# Patient Record
Sex: FEMALE | Race: WHITE | NOT HISPANIC OR LATINO | Employment: OTHER | URBAN - METROPOLITAN AREA
[De-identification: names, ages, dates, MRNs, and addresses within clinical notes are randomized per-mention and may not be internally consistent; named-entity substitution may affect disease eponyms.]

---

## 2017-02-23 ENCOUNTER — HISTORICAL (OUTPATIENT)
Dept: WOUND CARE | Facility: HOSPITAL | Age: 32
End: 2017-02-23

## 2017-05-08 LAB
BILIRUB SERPL-MCNC: NEGATIVE MG/DL
BLOOD URINE, POC: NEGATIVE
CLARITY, POC UA: NORMAL
COLOR, POC UA: NORMAL
GLUCOSE UR QL STRIP: NEGATIVE
KETONES UR QL STRIP: NEGATIVE
LEUKOCYTE EST, POC UA: NEGATIVE
NITRITE, POC UA: NEGATIVE
PH, POC UA: 7
PROTEIN, POC: NORMAL
SPECIFIC GRAVITY, POC UA: 1.01
UROBILINOGEN, POC UA: NORMAL

## 2017-05-23 ENCOUNTER — HOSPITAL ENCOUNTER (OUTPATIENT)
Dept: OBSTETRICS AND GYNECOLOGY | Facility: HOSPITAL | Age: 32
End: 2017-05-23
Attending: OBSTETRICS & GYNECOLOGY | Admitting: OBSTETRICS & GYNECOLOGY

## 2017-07-07 LAB
BILIRUB SERPL-MCNC: NEGATIVE MG/DL
BLOOD URINE, POC: NEGATIVE
CLARITY, POC UA: CLEAR
COLOR, POC UA: YELLOW
GLUCOSE UR QL STRIP: NEGATIVE
KETONES UR QL STRIP: NEGATIVE
LEUKOCYTE EST, POC UA: NEGATIVE
NITRITE, POC UA: NEGATIVE
PH, POC UA: 7
POC BETA-HCG (QUAL): NEGATIVE
PROTEIN, POC: NEGATIVE
SPECIFIC GRAVITY, POC UA: 1.01
UROBILINOGEN, POC UA: NORMAL

## 2017-12-22 LAB — POC BETA-HCG (QUAL): NEGATIVE

## 2018-02-15 ENCOUNTER — HISTORICAL (OUTPATIENT)
Dept: ADMINISTRATIVE | Facility: HOSPITAL | Age: 33
End: 2018-02-15

## 2018-02-15 LAB
APPEARANCE, UA: CLEAR
BACTERIA #/AREA URNS AUTO: ABNORMAL /[HPF]
BILIRUB UR QL STRIP: NEGATIVE
COLOR UR: YELLOW
GLUCOSE (UA): NORMAL
HAV IGM SERPL QL IA: NONREACTIVE
HBV CORE IGM SERPL QL IA: NONREACTIVE
HBV SURFACE AG SERPL QL IA: NEGATIVE
HCV AB SERPL QL IA: NONREACTIVE
HGB UR QL STRIP: NEGATIVE
HIV 1+2 AB+HIV1 P24 AG SERPL QL IA: NONREACTIVE
HYALINE CASTS #/AREA URNS LPF: ABNORMAL /[LPF]
KETONES UR QL STRIP: NEGATIVE
LEUKOCYTE ESTERASE UR QL STRIP: 75 LEU/UL
NITRITE UR QL STRIP: NEGATIVE
PH UR STRIP: 6.5 [PH] (ref 4.5–8)
PROT UR QL STRIP: 20 MG/DL
RBC #/AREA URNS AUTO: ABNORMAL /[HPF]
SP GR UR STRIP: 1.02 (ref 1–1.03)
SQUAMOUS #/AREA URNS LPF: ABNORMAL /[LPF]
T PALLIDUM AB SER QL: NONREACTIVE
UROBILINOGEN UR STRIP-ACNC: 2 MG/DL
WBC #/AREA URNS AUTO: ABNORMAL /HPF

## 2018-04-02 LAB — POC BETA-HCG (QUAL): NEGATIVE

## 2019-07-16 ENCOUNTER — HISTORICAL (OUTPATIENT)
Dept: ADMINISTRATIVE | Facility: HOSPITAL | Age: 34
End: 2019-07-16

## 2019-07-16 LAB
BILIRUB SERPL-MCNC: NEGATIVE MG/DL
BLOOD URINE, POC: NEGATIVE
CLARITY, POC UA: CLEAR
COLOR, POC UA: YELLOW
GLUCOSE UR QL STRIP: NEGATIVE
KETONES UR QL STRIP: NEGATIVE
LEUKOCYTE EST, POC UA: NEGATIVE
NITRITE, POC UA: NEGATIVE
PH, POC UA: 6
POC BETA-HCG (QUAL): POSITIVE
PROTEIN, POC: NORMAL
SPECIFIC GRAVITY, POC UA: 1.02
UROBILINOGEN, POC UA: NORMAL

## 2019-07-18 ENCOUNTER — HISTORICAL (OUTPATIENT)
Dept: FAMILY MEDICINE | Facility: CLINIC | Age: 34
End: 2019-07-18

## 2019-07-18 LAB
ABS NEUT (OLG): 9.24 X10(3)/MCL (ref 2.1–9.2)
AMPHET UR QL SCN: POSITIVE
BARBITURATE SCN PRESENT UR: NEGATIVE
BASOPHILS # BLD AUTO: 0.04 X10(3)/MCL
BASOPHILS NFR BLD AUTO: 0 %
BENZODIAZ UR QL SCN: NEGATIVE
BUN SERPL-MCNC: 11 MG/DL (ref 7–18)
CALCIUM SERPL-MCNC: 9 MG/DL (ref 8.5–10.1)
CANNABINOIDS UR QL SCN: NEGATIVE
CHLORIDE SERPL-SCNC: 104 MMOL/L (ref 98–107)
CO2 SERPL-SCNC: 25 MMOL/L (ref 21–32)
COCAINE UR QL SCN: NEGATIVE
CREAT SERPL-MCNC: 0.6 MG/DL (ref 0.6–1.3)
CREAT/UREA NIT SERPL: 18
EOSINOPHIL # BLD AUTO: 0.45 10*3/UL
EOSINOPHIL NFR BLD AUTO: 4 %
ERYTHROCYTE [DISTWIDTH] IN BLOOD BY AUTOMATED COUNT: 12.2 % (ref 11.5–14.5)
FINAL CULTURE: NORMAL
GLUCOSE SERPL-MCNC: 93 MG/DL (ref 74–106)
HBV SURFACE AG SERPL QL IA: NEGATIVE
HCT VFR BLD AUTO: 39 % (ref 35–46)
HCV AB SERPL QL IA: NONREACTIVE
HGB BLD-MCNC: 13.2 GM/DL (ref 12–16)
HIV 1+2 AB+HIV1 P24 AG SERPL QL IA: NONREACTIVE
IMM GRANULOCYTES # BLD AUTO: 0.06 10*3/UL
IMM GRANULOCYTES NFR BLD AUTO: 0 %
LYMPHOCYTES # BLD AUTO: 2.07 X10(3)/MCL
LYMPHOCYTES NFR BLD AUTO: 16 % (ref 13–40)
MCH RBC QN AUTO: 30.6 PG (ref 26–34)
MCHC RBC AUTO-ENTMCNC: 33.8 GM/DL (ref 31–37)
MCV RBC AUTO: 90.3 FL (ref 80–100)
MONOCYTES # BLD AUTO: 0.69 X10(3)/MCL
MONOCYTES NFR BLD AUTO: 6 % (ref 0–24)
NEUTROPHILS # BLD AUTO: 9.24 X10(3)/MCL
NEUTROPHILS NFR BLD AUTO: 74 X10(3)/MCL
OPIATES UR QL SCN: NEGATIVE
PCP UR QL: NEGATIVE
PH UR STRIP.AUTO: 6.5 [PH] (ref 5–8)
PLATELET # BLD AUTO: 235 X10(3)/MCL (ref 130–400)
PMV BLD AUTO: 11.5 FL (ref 7.4–10.4)
POTASSIUM SERPL-SCNC: 3.6 MMOL/L (ref 3.5–5.1)
RBC # BLD AUTO: 4.32 X10(6)/MCL (ref 4–5.2)
SODIUM SERPL-SCNC: 136 MMOL/L (ref 136–145)
T PALLIDUM AB SER QL: NONREACTIVE
TEMPERATURE, URINE (OHS): 21.8 DEGC (ref 20–25)
WBC # SPEC AUTO: 12.6 X10(3)/MCL (ref 4.5–11)

## 2020-01-16 ENCOUNTER — HISTORICAL (OUTPATIENT)
Dept: ADMINISTRATIVE | Facility: HOSPITAL | Age: 35
End: 2020-01-16

## 2020-01-16 LAB
AMPHET UR QL SCN: NEGATIVE
APPEARANCE, UA: ABNORMAL
BACTERIA #/AREA URNS AUTO: ABNORMAL /HPF
BARBITURATE SCN PRESENT UR: NEGATIVE
BENZODIAZ UR QL SCN: NEGATIVE
BILIRUB SERPL-MCNC: NORMAL MG/DL
BILIRUB UR QL STRIP: NEGATIVE
BLOOD URINE, POC: NORMAL
CANNABINOIDS UR QL SCN: NEGATIVE
CLARITY, POC UA: NORMAL
COCAINE UR QL SCN: NEGATIVE
COLOR UR: YELLOW
COLOR, POC UA: YELLOW
GLUCOSE (UA): NEGATIVE
GLUCOSE UR QL STRIP: NEGATIVE
HGB UR QL STRIP: NEGATIVE
HYALINE CASTS #/AREA URNS LPF: ABNORMAL /LPF
KETONES UR QL STRIP: NEGATIVE
KETONES UR QL STRIP: NEGATIVE
LEUKOCYTE EST, POC UA: NORMAL
LEUKOCYTE ESTERASE UR QL STRIP: 250 LEU/UL
NITRITE UR QL STRIP: NEGATIVE
NITRITE, POC UA: NEGATIVE
OPIATES UR QL SCN: NEGATIVE
PCP UR QL: NEGATIVE
PH UR STRIP.AUTO: 7 [PH] (ref 5–8)
PH UR STRIP: 7 [PH] (ref 4.5–8)
PH, POC UA: 7
PROT UR QL STRIP: 30 MG/DL
PROTEIN, POC: NORMAL
RBC #/AREA URNS AUTO: ABNORMAL /HPF
SP GR UR STRIP: 1.02 (ref 1–1.03)
SPECIFIC GRAVITY, POC UA: 1.02
SQUAMOUS #/AREA URNS LPF: >100 /LPF
TEMPERATURE, URINE (OHS): 25 DEGC (ref 20–25)
UROBILINOGEN UR STRIP-ACNC: 4 MG/DL
UROBILINOGEN, POC UA: NORMAL
WBC #/AREA URNS AUTO: ABNORMAL /HPF

## 2020-01-18 LAB — FINAL CULTURE: NO GROWTH

## 2020-01-23 ENCOUNTER — HISTORICAL (OUTPATIENT)
Dept: ADMINISTRATIVE | Facility: HOSPITAL | Age: 35
End: 2020-01-23

## 2020-01-23 LAB
ABS NEUT (OLG): 7.91 X10(3)/MCL (ref 2.1–9.2)
AMPHET UR QL SCN: NEGATIVE
BARBITURATE SCN PRESENT UR: NEGATIVE
BASOPHILS # BLD AUTO: 0 X10(3)/MCL (ref 0–0.2)
BASOPHILS NFR BLD AUTO: 0 %
BENZODIAZ UR QL SCN: NEGATIVE
BILIRUB SERPL-MCNC: NEGATIVE MG/DL
BLOOD URINE, POC: NEGATIVE
BUN SERPL-MCNC: 7 MG/DL (ref 7–18)
CALCIUM SERPL-MCNC: 8.9 MG/DL (ref 8.5–10.1)
CANNABINOIDS UR QL SCN: NEGATIVE
CHLORIDE SERPL-SCNC: 105 MMOL/L (ref 98–107)
CLARITY, POC UA: NORMAL
CO2 SERPL-SCNC: 26 MMOL/L (ref 21–32)
COCAINE UR QL SCN: NEGATIVE
COLOR, POC UA: YELLOW
CREAT SERPL-MCNC: 0.5 MG/DL (ref 0.6–1.3)
CREAT/UREA NIT SERPL: 14
EOSINOPHIL # BLD AUTO: 0.1 X10(3)/MCL (ref 0–0.9)
EOSINOPHIL NFR BLD AUTO: 1 %
ERYTHROCYTE [DISTWIDTH] IN BLOOD BY AUTOMATED COUNT: 14.6 % (ref 11.5–14.5)
GLUCOSE SERPL-MCNC: 66 MG/DL (ref 74–106)
GLUCOSE UR QL STRIP: NEGATIVE
HBV SURFACE AG SERPL QL IA: NEGATIVE
HCT VFR BLD AUTO: 34.7 % (ref 35–46)
HCV AB SERPL QL IA: NONREACTIVE
HGB BLD-MCNC: 11 GM/DL (ref 12–16)
HIV 1+2 AB+HIV1 P24 AG SERPL QL IA: NONREACTIVE
IMM GRANULOCYTES # BLD AUTO: 0.08 10*3/UL
IMM GRANULOCYTES NFR BLD AUTO: 1 %
KETONES UR QL STRIP: NEGATIVE
LEUKOCYTE EST, POC UA: NORMAL
LYMPHOCYTES # BLD AUTO: 2 X10(3)/MCL (ref 0.6–4.6)
LYMPHOCYTES NFR BLD AUTO: 19 %
MCH RBC QN AUTO: 28.4 PG (ref 26–34)
MCHC RBC AUTO-ENTMCNC: 31.7 GM/DL (ref 31–37)
MCV RBC AUTO: 89.4 FL (ref 80–100)
MONOCYTES # BLD AUTO: 0.4 X10(3)/MCL (ref 0.1–1.3)
MONOCYTES NFR BLD AUTO: 4 %
NEUTROPHILS # BLD AUTO: 7.91 X10(3)/MCL (ref 2.1–9.2)
NEUTROPHILS NFR BLD AUTO: 75 %
NITRITE, POC UA: NEGATIVE
OPIATES UR QL SCN: NEGATIVE
PCP UR QL: NEGATIVE
PH UR STRIP.AUTO: 7 [PH] (ref 5–8)
PH, POC UA: 7
PLATELET # BLD AUTO: 213 X10(3)/MCL (ref 130–400)
PMV BLD AUTO: 12 FL (ref 7.4–10.4)
POTASSIUM SERPL-SCNC: 3.6 MMOL/L (ref 3.5–5.1)
PROTEIN, POC: NEGATIVE
RBC # BLD AUTO: 3.88 X10(6)/MCL (ref 4–5.2)
SODIUM SERPL-SCNC: 137 MMOL/L (ref 136–145)
SPECIFIC GRAVITY, POC UA: 1.01
T PALLIDUM AB SER QL: NONREACTIVE
TEMPERATURE, URINE (OHS): 24 DEGC (ref 20–25)
UROBILINOGEN, POC UA: NORMAL
WBC # SPEC AUTO: 10.6 X10(3)/MCL (ref 4.5–11)

## 2020-01-25 LAB
FINAL CULTURE: NORMAL
FINAL CULTURE: NORMAL

## 2022-04-10 ENCOUNTER — HISTORICAL (OUTPATIENT)
Dept: ADMINISTRATIVE | Facility: HOSPITAL | Age: 37
End: 2022-04-10

## 2022-04-11 ENCOUNTER — HISTORICAL (OUTPATIENT)
Dept: ADMINISTRATIVE | Facility: HOSPITAL | Age: 37
End: 2022-04-11

## 2022-04-28 VITALS
HEIGHT: 65 IN | OXYGEN SATURATION: 99 % | DIASTOLIC BLOOD PRESSURE: 89 MMHG | WEIGHT: 137.13 LBS | BODY MASS INDEX: 22.85 KG/M2 | SYSTOLIC BLOOD PRESSURE: 127 MMHG

## 2022-04-28 VITALS
DIASTOLIC BLOOD PRESSURE: 89 MMHG | OXYGEN SATURATION: 99 % | WEIGHT: 137.13 LBS | BODY MASS INDEX: 22.85 KG/M2 | SYSTOLIC BLOOD PRESSURE: 127 MMHG | HEIGHT: 65 IN

## 2022-04-30 NOTE — PROGRESS NOTES
Patient:   Yuni Lentz            MRN: [id number]            FIN: [id number]               Age:   34 years     Sex:  Female     :  1985   Associated Diagnoses:   IUP (intrauterine pregnancy), incidental   Author:   Jayshree Martin MD      Chief Complaint   Pregnant   2020 11:01 CST      37wks 2days        History of Present Illness     History of Present Illness:  33yo  at 37^2 WGA, EGD per 22^3 ultrasound only (EDC 20) presents for her initial OB visit.         Today:   Some heartburn with occasional vomiting. Patient denies urinary urgency/urinary frequency/or discomfort. Patient wants to have tubal ligation..      Chronic Issues:  Bipolar d/o      OB Review of Systems:  Vaginal bleeding: denies  Vaginal discharge: denies  Loss of fluid: denies  Contractions: denies  Headaches: denies  Vision changes: denies  Edema: denies      Gestational History:  G1: Full term,   G2: Full term,   G3:Full term,   G4:Full term,   G5: Full term,  for non-reassuring FHTs  G6: Full term, Repeat   G7: current      Gyn History:   -LMP: 2019  -Age at menarche: 13yo  -Menstrual hx: periods regular interval lasting 3-5 days, every 28 days  -History of STDs and/or Abnormal PAPs: Denies h/o abnormal pap smears, last Pap smear 2019(NIL), has herpes, trichomoniasis  1x but treated    Past Medical History: Bipolar  Surgical History:  ×2  Family History: mom passed from ovarian ca  Social History: smokes 5cigs/day, denies EtOH, drugs  Medications: Prenatal multivitamin  Allergies: Bactrim             Review of Systems   no f, cp, sob      Health Status   Allergies:    Allergic Reactions (Selected)  Severity Not Documented  Bactrim- Hives.,    Allergies (1) Active Reaction  Bactrim hives     Current medications:  (Selected)   Outpatient Medications  Ordered  tetanus/diphtheria/pertussis, acel (Tdap) 5 units-2 units-15.5 mcg/0.5 mL intramuscular suspension...: 0.5 mL, form:  Injection, IM, Once, first dose 08/15/12 20:00:00 CDT, stop date 08/15/12 20:00:00 CDT, Give if patient had last tetanus immunization greater than 2 years prior.  Prescriptions  Prescribed  Prenatal Multivitamin/Folic Acid 0.4 mg oral tablet ([facility] Substitution): 1 tab(s), Oral, Daily, # 100 tab(s), 5 Refill(s), Pharmacy: [facility],    Home Medications (1) Active  Prenatal Multivitamin/Folic Acid 0.4 mg oral tablet ([facility] Substitution) 1 tab(s), Oral, Daily     Problem list:    All Problems  Encounter for fetal anatomic survey / SNOMED CT 231612354 / Confirmed  Low lying placenta without hemorrhage, antepartum / SNOMED CT 1584913242 / Confirmed  Acute viral bronchitis / SNOMED CT 830171816 / Confirmed  Tobacco abuse / SNOMED CT 173975967 / Confirmed  Ineffective coping (individual) / SNOMED CT 29971520 / Confirmed  Infectious disease in mother complicating pregnancy, childbirth AND/OR puerperium / SNOMED CT 69960178 / Confirmed  Problem added by Discern Expert  Group B streptococcus / SNOMED CT 846104941 / Confirmed  Problem added by Discern Expert  Abscess of chin / SNOMED CT 23789773 / Confirmed  Active labor / ICD-9- / Confirmed   (spontaneous vaginal delivery) / ICD-9- / Confirmed  Pregnant / SNOMED CT 433859809 / Confirmed,    Active Problems (11)  Abscess of chin   Active labor   Acute viral bronchitis   Encounter for fetal anatomic survey   Group B streptococcus   Ineffective coping (individual)   Infectious disease in mother complicating pregnancy, childbirth AND/OR puerperium   Low lying placenta without hemorrhage, antepartum   Pregnant    (spontaneous vaginal delivery)   Tobacco abuse         Physical Examination   Vital Signs   2020 11:01 CST      Temperature Oral          36.7 DegC                             Temperature Oral (calculated)             98.06 DegF                             Peripheral Pulse Rate     86 bpm                             Respiratory Rate           20 br/min                             SpO2                      99 %                             Systolic Blood Pressure   127 mmHg                             Diastolic Blood Pressure  89 mmHg                             Mean Arterial Pressure, Cuff              102 mmHg                             Blood Pressure Location   Left arm                             Blood Pressure Cuff Size  Adult small     Measurements from flowsheet : Measurements   1/23/2020 11:01 CST      Weight Measured           62.2 kg                             Weighing Method           Standing                             Height/Length Measured    165 cm                             BSA Measured              1.69 m2                             Body Mass Index Measured  22.85 kg/m2                             Ideal Body Weight Calculated              56.023097 kg     General: no apparent distress  HEENT: EOMI  Respiratory: normal work of breathing  Cardiovascular: normal perfusion  Gastrointestinal: Gravid, nontender to palpation, no guarding, no rigidity  Obstetric Exam: No abnormalities were noted during examination of the vulva, , Fundal height 36.5cm  Genitourinary: No suprapubic tenderness  Neurologic: Alert and oriented  Psychiatric: cooperative         Health Maintenance      Health Maintenance     Pending (in the next year)     There are no current recommendations pending        Due In Future            ADL Screening not due until  06/14/20  and every 1  year(s)           Alcohol Misuse Screening not due until  06/14/20  and every 1  year(s)           Smoking Cessation not due until  06/14/20  and every 1  year(s)           Obesity Screening not due until  01/01/21  and every 1  year(s)           Blood Pressure Screening not due until  01/22/21  and every 1  year(s)     Satisfied (in the past 1 year)        Satisfied            ADL Screening on  06/14/19.  Satisfied by Yuliana WARE, Elaine Chacko           Blood Pressure Screening  on  01/23/20.  Satisfied by Hailey Richardson           Body Mass Index Check on  01/23/20.  Satisfied by Hailey Richardson           Cervical Cancer Screening on  07/16/19.  Satisfied by Tegan Ferrell           Depression Screening on  01/16/20.  Satisfied by Kylee Fermin RN           Hypertension Management-Blood Pressure on  01/23/20.  Satisfied by Hailey Richardson.           Influenza Vaccine on  01/16/20.  Satisfied by Kylee Fermin RN           Obesity Screening on  01/23/20.  Satisfied by Hailey Richardson          Review / Management   Results review:  Lab results   1/23/2020 11:11 CST      Urine Color Urine Dipstick                Yellow                             Urine Appearance Urine Dipstick           Slightly cloudy                             pH Urine Dipstick         7                             Specific Gravity Urine Dipstick           1.015                             Blood Urine Dipstick      Negative                             Glucose Urine Dipstick    Negative                             Ketones Urine Dipstick    Negative                             Protein Urine Dipstick    Negative                             Bilirubin Urine Dipstick  Negative                             Urobilinogen Urine Dipstick               1 mg/dl                             Leukocytes Urine Dipstick Small                             Nitrite Urine Dipstick    Negative  .    Initial OB Labs: Initial OB labs collected on 1/23/20, except for pap   - Blood Type and Rh:    - Antibody Screen:    - CBC H/H:    - HIV:    - RPR:    - GC:    - CT:    - HBsAg:    - HCVAb:    - Rubella:    - Varicella:    - UA & Culture:    - Sickle Cell Screen:    - PAP:    - Influenza vaccine date:    15-20 Weeks Lab    - Quad Screen: out of range    28 Week Lab    - 1H GTT: out of range    - Rhogam: NA    - Date of Tdap: _    - CBC H/H: _    - RPR: _    36 Week Lab    - CBC H/H: _    - RPR: _    - GBS Culture: obtained 1/23/20     - HIV: _    - GC: _      Radiology results     Ultrasound(s):    Comments: 2nd trimester intrauterine pregnancy measuring 22^3 for EDC of 20. FHR: 145. Viable pregnancy. ASSIGN EDC: 2020           Impression and Plan     1. Late for prenatal care @ 36^2  - PNVs  - Routine (initial and 36wk) labs: pending, patient left before bloodwork obtained last week, but obtained today, UDS per DCFS request today  - Mother plans on breast and bottlefeeds  - Tubal ligation consent signed and placed in chart  - Strict Labor Precautions: Discussed meds, diet, and complications associated with pregnancy in detail. fever, vaginal bleeding or leaking fluid, belly cramping or pain, shortness of breathchest pain, swelling of the facehands, ankles and feet or legs.  If don't feel the baby move in over an hour. Change in vision. Severe headache that are noted resolved with medication. Strict diet and over-the-counter precautions were provided.   - Scheduled rpt  for 2020 9am - will be 39^1 WGA    2. Acid reflux  -Prevacid sent to pharmacy    Disposition:  RTC in 1wk in [facility]      Diagnosis     IUP (intrauterine pregnancy), incidental ([id number] Z34.90).       Jayshree Martin MD (HO1)  [facility]

## 2022-04-30 NOTE — PROGRESS NOTES
Patient:   Yuni Lentz            MRN: [id number]            FIN: [id number]               Age:   34 years     Sex:  Female     :  1985   Associated Diagnoses:   IUP (intrauterine pregnancy), incidental   Author:   Jayshree Martin MD      Chief Complaint   Pregnant   2020 9:30 CST       Initial OB 36w2d - no c/o, wants schedule  for 20, asked about getting tubes tied or IUD      History of Present Illness     History of Present Illness:  33yo  at 36^2 WGA, EGD per 22^3 ultrasound only (EDC 20) presents for her initial OB visit.         Today:   No major complaints today. Patient denies urinary urgency/urinary frequency/or discomfort. Patient wants to have tubal ligation..      Chronic Issues:  Bipolar d/o      OB Review of Systems:  Vaginal bleeding: denies  Vaginal discharge: denies  Loss of fluid: denies  Contractions: denies  Headaches: denies  Vision changes: denies  Edema: denies      Gestational History:  G1: Full term,   G2: Full term,   G3: Full term,   G4: Full term,   G5: Full term,  for non-reassuring FHTs  G6: Full term, Repeat   G7: current      Gyn History:   -LMP: 2019  -Age at menarche: 15yo  -Menstrual hx: periods regular interval lasting 3-5 days, every 28 days  -History of STDs and/or Abnormal PAPs: Denies h/o abnormal pap smears, last Pap smear 2019(NIL), has herpes, trichomoniasis  1x but treater    Past Medical History: Bipolar  Surgical History:  ×2  Family History: mom passed from ovarian ca  Social History: smokes 5cigs/day, denies EtOH, drugs  Medications: Prenatal multivitamin  Allergies: Bactrim             Review of Systems   no f, cp, sob      Health Status   Allergies:    Allergic Reactions (Selected)  Severity Not Documented  Bactrim- Hives.,    Allergies (1) Active Reaction  Bactrim hives     Current medications:  (Selected)   Outpatient Medications  Ordered  tetanus/diphtheria/pertussis, acel  (Tdap) 5 units-2 units-15.5 mcg/0.5 mL intramuscular suspension...: 0.5 mL, form: Injection, IM, Once, first dose 08/15/12 20:00:00 CDT, stop date 08/15/12 20:00:00 CDT, Give if patient had last tetanus immunization greater than 2 years prior.  Prescriptions  Prescribed  Prenatal Multivitamin/Folic Acid 0.4 mg oral tablet ([facility] Substitution): 1 tab(s), Oral, Daily, # 100 tab(s), 5 Refill(s), Pharmacy: [facility],    Home Medications (1) Active  Prenatal Multivitamin/Folic Acid 0.4 mg oral tablet ([facility] Substitution) 1 tab(s), Oral, Daily     Problem list:    All Problems  Encounter for fetal anatomic survey / SNOMED CT 838007252 / Confirmed  Low lying placenta without hemorrhage, antepartum / SNOMED CT 8974304841 / Confirmed  Acute viral bronchitis / SNOMED CT 462232003 / Confirmed  Tobacco abuse / SNOMED CT 404771127 / Confirmed  Ineffective coping (individual) / SNOMED CT 04342066 / Confirmed  Infectious disease in mother complicating pregnancy, childbirth AND/OR puerperium / SNOMED CT 93020244 / Confirmed  Problem added by Discern Expert  Group B streptococcus / SNOMED CT 102491760 / Confirmed  Problem added by Discern Expert  Abscess of chin / SNOMED CT 16618781 / Confirmed  Active labor / ICD-9- / Confirmed   (spontaneous vaginal delivery) / ICD-9- / Confirmed  Pregnant / SNOMED CT 642484373 / Confirmed,    Active Problems (11)  Abscess of chin   Active labor   Acute viral bronchitis   Encounter for fetal anatomic survey   Group B streptococcus   Ineffective coping (individual)   Infectious disease in mother complicating pregnancy, childbirth AND/OR puerperium   Low lying placenta without hemorrhage, antepartum   Pregnant    (spontaneous vaginal delivery)   Tobacco abuse         Physical Examination   Vital Signs   2020 9:30 CST       Temperature Oral          37.1 DegC                             Temperature Oral (calculated)             98.78 DegF                              Peripheral Pulse Rate     106 bpm  HI                             Respiratory Rate          18 br/min                             SpO2                      98 %                             Saturation Probe Site     Hand, right                             Systolic Blood Pressure   121 mmHg                             Diastolic Blood Pressure  77 mmHg                             Blood Pressure Location   Left arm                             Blood Pressure Cuff Size  Adult     Measurements from flowsheet : Measurements   1/16/2020 9:30 CST       Weight Measured           59.8 kg                             Height/Length Measured    165 cm     General: no apparent distress  HEENT: EOMI  Respiratory: normal work of breathing  Cardiovascular: normal perfusion  Gastrointestinal: Gravid, nontender to palpation, no guarding, no rigidity  Obstetric Exam: No abnormalities were noted during examination of the vulva, vagina, cervix, , Fundal height 34cm  Genitourinary: No suprapubic tenderness  Neurologic: Alert and oriented  Psychiatric: cooperative         Health Maintenance      Health Maintenance     Pending (in the next year)     There are no current recommendations pending        Due In Future            ADL Screening not due until  06/14/20  and every 1  year(s)           Alcohol Misuse Screening not due until  06/14/20  and every 1  year(s)           Smoking Cessation not due until  06/14/20  and every 1  year(s)           Obesity Screening not due until  01/01/21  and every 1  year(s)           Blood Pressure Screening not due until  01/15/21  and every 1  year(s)     Satisfied (in the past 1 year)        Satisfied            ADL Screening on  06/14/19.  Satisfied by Elaine Bridges RN           Blood Pressure Screening on  01/16/20.  Satisfied by Kylee Fermin RN           Body Mass Index Check on  10/18/19.  Satisfied by Mely Cruz RN           Cervical Cancer Screening on  07/16/19.  Satisfied by  Tegan Ferrell           Depression Screening on  01/16/20.  Satisfied by Kylee Fermin RN           Hypertension Management-Blood Pressure on  01/16/20.  Satisfied by Kylee Fermin RN           Influenza Vaccine on  01/16/20.  Satisfied by Kylee Fermin RN           Obesity Screening on  01/16/20.  Satisfied by Kylee Fermin RN          Review / Management   Initial OB Labs: Initial OB labs collected on 1/16/20, except for pap   - Blood Type and Rh:    - Antibody Screen:    - CBC H/H:    - HIV:    - RPR:    - GC:    - CT:    - HBsAg:    - HCVAb:    - Rubella:    - Varicella:    - UA & Culture:    - Sickle Cell Screen:    - PAP:    - Influenza vaccine date:    15-20 Weeks Lab    - Quad Screen: out of range    28 Week Lab    - 1H GTT: out of range    - Rhogam: NA    - Date of Tdap: _    - CBC H/H: _    - RPR: _    36 Week Lab    - CBC H/H: _    - RPR: _    - GBS Culture: _    - HIV: _    - GC: _      Radiology results     Ultrasound(s):    Comments: 2nd trimester intrauterine pregnancy measuring 22^3 for EDC of 2/11/20. FHR: 145. Viable pregnancy. ASSIGN EDC: 2/11/2020       Results review:  Lab results   1/16/2020 9:40 CST       Urine Color Urine Dipstick                Yellow                             Urine Appearance Urine Dipstick           Cloudy                             pH Urine Dipstick         7                             Specific Gravity Urine Dipstick           1.020                             Blood Urine Dipstick      Trace                             Glucose Urine Dipstick    Negative                             Ketones Urine Dipstick    Negative                             Protein Urine Dipstick    Trace                             Bilirubin Urine Dipstick  Small                             Urobilinogen Urine Dipstick               2 mg/dl                             Leukocytes Urine Dipstick Small                             Nitrite Urine Dipstick    Negative  .       Impression and  Plan     1. Intrauterine Pregnancy:  - OB Protocol   - PNVs  - Routine (initial and 36wk) labs: pending, also UDS since pt tested + for Amphetamines in 7/2019 and 10/2019  - Mother plans on breast and bottlefeeds  - Tubal ligation consent signed and placed in chart  - Strict Labor Precautions: Discussed meds, diet, and complications associated with pregnancy in detail. fever, vaginal bleeding or leaking fluid, belly cramping or pain, shortness of breathchest pain, swelling of the facehands, ankles and feet or legs.  If don't feel the baby move in over an hour. Change in vision. Severe headache that are noted resolved with medication.  Strict diet and over-the-counter precautions were provided.       Disposition:  Assigned to [facility]  RTC in 1wk       Diagnosis     IUP (intrauterine pregnancy), incidental ([id number] Z34.90).       Jayshree Martin MD (HO1)  \[facility] [place] [facility]\""-[facility]

## 2022-04-30 NOTE — PROGRESS NOTES
Patient:   Yuni Carver            MRN: [id number]            FIN: [id number]               Age:   33 years     Sex:  Female     :  1985   Associated Diagnoses:   None   Author:   Magan Torres MD      Chief Complaint   2019 14:56 CDT      WIC-UPT, c/o burning with urination, reports vaginal discharge with itching  x 3 days        History of Present Illness     Age: 33    G's & P's: [id number]    Gestation age: 9&6/7 WGA by LMP only    HEENA: 2020 by LMP only    Consistent with: (awaiting US scheduling)    New Issues: [facility] slept with new sexual partner in July, current partner and new partner named Marissa (states a friend).  Patient found out she was pregnant on 2019. [facility] sexual encounter was after finding out she was pregnant    Chronic Issues: Bipolar, has not returned to [facility] since instructed to do so prior visit 2019      Histories   Gestational History:  G1: Full term,   G2: Full term,   G3: Full term,   G4: Full term,   G5: Full term,  for non-reassuring FHTs  G6: Full term, Repeat   G7: current    Gyn History:    - LMP: 2019   - Age at menarche: 14 years   - Menstrual hx: regular, 28 day cycles, normal pads/day   - History of birth control: Never    - History of STDs and/or Abnormal PAPs: Trichomoniasis (treater in herself, partner not treater), no abnormal PAPs in the past    PMHx: HTN, depression, bipolar disorder  PSHx:  x2  Social Hx: Denies alcohol and illicits, smoking status needs to be addressed  Family Hx: Non-contributory  Medications: PNVs      Review of Systems     Antepartum specific     - Fetal movements: Yes   - Vaginal bleeding: No   - Vaginal discharge: Green discharge   - Loss of fluid: Denies   - Contractions: Denies   - Headaches: Denies   - Vision changes: Denies   - Edema: Feet, better with elevation  - Nausea/vomiting: Has been constant      Constitutional:  No fever, No chills, No fatigue.     Ear/Nose/Mouth/Throat:  Negative except as documented in history of present illness.    Respiratory:  Shortness of breath, No cough, No wheezing.    Cardiovascular:  No chest pain, No palpitations, No peripheral edema.    Gastrointestinal:  No nausea, No vomiting.    Genitourinary:  Negative except as documented in history of present illness.    Gynecologic:  Negative except as documented in history of present illness.    Musculoskeletal:  No back pain, No joint pain, No muscle pain.    Integumentary:  Negative except as documented in history of present illness.    Psychiatric:  [place], Not homicidal, Not suicidal.       Physical Examination   Vital Signs   7/16/2019 14:56 CDT      Temperature Oral          36.9 DegC                             Peripheral Pulse Rate     107 bpm  HI                             Respiratory Rate          20 br/min                             SpO2                      99 %                             Systolic Blood Pressure   98 mmHg                             Diastolic Blood Pressure  64 mmHg     Measurements from flowsheet : Measurements   7/16/2019 14:56 CDT      Weight Measured           53.30 kg                             Height/Length Measured    165 cm                             Body Mass Index Measured  19.58 kg/m2     General:  Alert and oriented, No acute distress.    Eye:  Extraocular movements are intact.    HENT:  Normocephalic.    Neck:  Supple.    Respiratory:  Lungs are clear to auscultation, Respirations are non-labored, Breath sounds are equal.    Cardiovascular:  Normal rate, Regular rhythm, Good pulses equal in all extremities, No edema.    Gastrointestinal:  Soft, Non-tender, Normal bowel sounds, gravid.    Obstetric Exam     Uterus: consistent with gestational age.     Perineum: intact, no lesions.     Vulva: no lesions.     Vagina: discharge (moderate amount, foul smelling, yellow), Embedded tampon present, no lesions, no bleeding.     Cervix: not dilated, no  lesions.     No contractions noted.     Integumentary:  Warm, Dry.    Neurologic:  Alert, Oriented, No focal deficits.    Cognition and Speech:  Speech clear and coherent.    Psychiatric:  Appropriate mood & affect.       Review / Management   Results review:  Lab results   2019 13:54 CDT      Urine Color Urine Dipstick                Yellow                             Urine Appearance Urine Dipstick           Clear                             pH Urine Dipstick         6                             Specific Gravity Urine Dipstick           1.025                             Blood Urine Dipstick      Negative                             Glucose Urine Dipstick    Negative                             Ketones Urine Dipstick    Negative                             Protein Urine Dipstick    Trace                             Bilirubin Urine Dipstick  Negative                             Urobilinogen Urine Dipstick               0.2 mg/dl                             Leukocytes Urine Dipstick Negative                             Nitrite Urine Dipstick    Negative  .      Initial OB Labs:   - Blood Type and Rh: _   - Antibody Screen: _   - CBC H/H: _   - HIV: _   - Syph Ab: _   - GC: _   - CT: _   - HBsAg: _   - HCVAb: _   - Rubella: _   - Varicella: _   - UA & Culture: _   - Sickle Cell Screen: _   - PAP: _   - Influenza vaccine date: _    15-20 Weeks Lab    - Quad Screen: _    28 Week Lab    - 1H GTT: _    - Rhogam: _    - Date of Tdap: _    - CBC H/H: _    - Syph Ab: _    36 Week Lab    - CBC H/H: _    - Syph Ab: _    - GBS Culture: _    - HIV: _    - Urine: GC: _    Ultrasound(s):  - Initial U/S:  - 20wk Anatomy scan:      Impression and Plan   Diagnosis   Currently pregnant 33 year old   female at 9wks 6days      1. Intrauterine Pregnancy    - OB Protocol     - PNVs prescribed this visit    - Urine dip: reviewed, as above    - Routine (initial) labs: ordered this visit, patient left before obtaining    - Mother  plans on breastfeeding    - Postpartum contraception discussion: Declines    - Labor precautions discussed in depth    2. High risk Psychiatic untreated Bipolar pregnancy   - UDS ordered this visit   - Consider case management consult at time of delivery    3. Vaginal discharge   - Vaginal foreign body removed on physical exam this visit, patient reports as tampon placed 3 months ago, no evidence of systemic illness   - Culture ordered and object submitted to lab for identification   - GC/CT and wet prep ordered     Schedule for OB initial US, schedule with initial OB clinic

## 2022-07-06 ENCOUNTER — HOSPITAL ENCOUNTER (EMERGENCY)
Facility: HOSPITAL | Age: 37
Discharge: HOME | End: 2022-07-06
Attending: FAMILY MEDICINE
Payer: MEDICARE

## 2022-07-06 VITALS
RESPIRATION RATE: 16 BRPM | HEIGHT: 65 IN | DIASTOLIC BLOOD PRESSURE: 75 MMHG | BODY MASS INDEX: 19.69 KG/M2 | HEART RATE: 98 BPM | SYSTOLIC BLOOD PRESSURE: 110 MMHG | TEMPERATURE: 98 F | OXYGEN SATURATION: 100 % | WEIGHT: 118.19 LBS

## 2022-07-06 DIAGNOSIS — A60.00 GENITAL HERPES SIMPLEX, UNSPECIFIED SITE: Primary | ICD-10-CM

## 2022-07-06 DIAGNOSIS — R82.90 ABNORMAL FINDING ON URINALYSIS: ICD-10-CM
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
## 2022-07-06 LAB
APPEARANCE UR: CLEAR
B-HCG UR QL: NEGATIVE
BACTERIA #/AREA URNS AUTO: ABNORMAL /HPF
BILIRUB UR QL STRIP.AUTO: NEGATIVE MG/DL
COLOR UR AUTO: ABNORMAL
CTP QC/QA: YES
GLUCOSE UR QL STRIP.AUTO: NORMAL MG/DL
HYALINE CASTS #/AREA URNS LPF: ABNORMAL /LPF
KETONES UR QL STRIP.AUTO: NEGATIVE MG/DL
LEUKOCYTE ESTERASE UR QL STRIP.AUTO: 250 UNIT/L
MUCOUS THREADS URNS QL MICRO: ABNORMAL /LPF
NITRITE UR QL STRIP.AUTO: NEGATIVE
PH UR STRIP.AUTO: 7.5 [PH]
PROT UR QL STRIP.AUTO: NEGATIVE MG/DL
RBC #/AREA URNS AUTO: ABNORMAL /HPF
RBC UR QL AUTO: NEGATIVE UNIT/L
SP GR UR STRIP.AUTO: 1.01
SQUAMOUS #/AREA URNS LPF: ABNORMAL /HPF
UROBILINOGEN UR STRIP-ACNC: NORMAL MG/DL
WBC #/AREA URNS AUTO: ABNORMAL /HPF

## 2022-07-06 PROCEDURE — 63600175 PHARM REV CODE 636 W HCPCS: Performed by: PHYSICIAN ASSISTANT

## 2022-07-06 PROCEDURE — 90715 TDAP VACCINE 7 YRS/> IM: CPT | Performed by: PHYSICIAN ASSISTANT

## 2022-07-06 PROCEDURE — 90471 IMMUNIZATION ADMIN: CPT | Performed by: PHYSICIAN ASSISTANT

## 2022-07-06 PROCEDURE — 99284 EMERGENCY DEPT VISIT MOD MDM: CPT | Mod: 25

## 2022-07-06 PROCEDURE — 81025 URINE PREGNANCY TEST: CPT | Performed by: PHYSICIAN ASSISTANT

## 2022-07-06 PROCEDURE — 81001 URINALYSIS AUTO W/SCOPE: CPT | Performed by: PHYSICIAN ASSISTANT

## 2022-07-06 RX ORDER — AMOXICILLIN AND CLAVULANATE POTASSIUM 875; 125 MG/1; MG/1
1 TABLET, FILM COATED ORAL 2 TIMES DAILY
Qty: 14 TABLET | Refills: 0 | OUTPATIENT
Start: 2022-07-06 | End: 2022-08-12

## 2022-07-06 RX ORDER — KETOROLAC TROMETHAMINE 10 MG/1
10 TABLET, FILM COATED ORAL EVERY 8 HOURS
Qty: 15 TABLET | Refills: 0 | Status: SHIPPED | OUTPATIENT
Start: 2022-07-06 | End: 2022-07-11

## 2022-07-06 RX ORDER — ACYCLOVIR 800 MG/1
800 TABLET ORAL 3 TIMES DAILY
Qty: 6 TABLET | Refills: 0 | OUTPATIENT
Start: 2022-07-06 | End: 2022-08-12

## 2022-07-06 RX ADMIN — TETANUS TOXOID, REDUCED DIPHTHERIA TOXOID AND ACELLULAR PERTUSSIS VACCINE, ADSORBED 0.5 ML: 5; 2.5; 8; 8; 2.5 SUSPENSION INTRAMUSCULAR at 11:07

## 2022-07-06 NOTE — ED PROVIDER NOTES
"Encounter Date: 7/6/2022       History     Chief Complaint   Patient presents with    Groin Pain     Reports genital herpes outbreak, constant x1 month; has not had an outbreak since a child. Also c/o dog bite to right medial ankle x4 days.     Yuni Lentz is a 36 y.o. female who presents to the ED with complaints of a herpes outbreak that is on her vagina to her anus that started 1 month(s) ago. She reports she was diagnosed with genital herpes "as a child" and has not had an outbreak since then. She reports the lesions are very painful. Also complaining of a dog bite to the R foot that occurred 4 days ago. She states she was cutting her grass and her neighbors dog bit her. States the owner of the dog reports the dog is up to date on vaccine. She states she is not up to date on tetanus vaccine.        The history is provided by the patient. No  was used.     Review of patient's allergies indicates:   Allergen Reactions    Sulfamethoxazole-trimethoprim Anaphylaxis     Past Medical History:   Diagnosis Date    Asthma     Genital herpes      History reviewed. No pertinent surgical history.  History reviewed. No pertinent family history.  Social History     Tobacco Use    Smoking status: Current Every Day Smoker     Packs/day: 0.50    Smokeless tobacco: Never Used   Substance Use Topics    Drug use: Yes     Frequency: 3.0 times per week     Types: Methamphetamines     Review of Systems   Constitutional: Negative for chills, fatigue and fever.   HENT: Negative for congestion, ear pain, sinus pain and sore throat.    Eyes: Negative for pain.   Respiratory: Negative for cough, chest tightness and shortness of breath.    Cardiovascular: Negative for chest pain.   Gastrointestinal: Negative for abdominal pain, constipation, diarrhea, nausea and vomiting.   Genitourinary: Positive for genital sores and vaginal pain. Negative for dysuria, flank pain, pelvic pain, urgency, vaginal bleeding and " "vaginal discharge.   Musculoskeletal: Negative for back pain and joint swelling.   Skin: Positive for wound. Negative for color change and rash.   Neurological: Negative for dizziness and weakness.   Psychiatric/Behavioral: Negative for behavioral problems and confusion.       Physical Exam     Initial Vitals [07/06/22 1043]   BP Pulse Resp Temp SpO2   110/75 98 16 98.3 °F (36.8 °C) 100 %      MAP       --         Physical Exam    Constitutional: She appears well-developed and well-nourished.   HENT:   Head: Normocephalic and atraumatic.   Nose: Nose normal.   Eyes: EOM are normal. Pupils are equal, round, and reactive to light.   Neck: Neck supple. No thyromegaly present. No JVD present.   Normal range of motion.  Cardiovascular: Normal rate, regular rhythm, normal heart sounds and intact distal pulses.   No murmur heard.  Pulmonary/Chest: Breath sounds normal. No respiratory distress. She has no wheezes. She has no rhonchi. She has no rales. She exhibits no tenderness.   Abdominal: Abdomen is soft. Bowel sounds are normal. She exhibits no distension. There is no abdominal tenderness. There is no rebound and no guarding.   Genitourinary:    Genitourinary Comments: Patient deferred exam today, reports painful lesions and "I know its herpes"     Musculoskeletal:         General: No tenderness or edema. Normal range of motion.      Cervical back: Normal range of motion and neck supple.     Lymphadenopathy:     She has no cervical adenopathy.   Neurological: She is alert and oriented to person, place, and time.   Skin: Skin is warm and dry. Capillary refill takes less than 2 seconds.   Right medial foot dog bite with associated erythema, no drainage or warmth   Psychiatric: She has a normal mood and affect. Thought content normal.         ED Course   Procedures  Labs Reviewed   URINALYSIS, REFLEX TO URINE CULTURE - Abnormal; Notable for the following components:       Result Value    Color, UA Light-Yellow (*)     " Leukocyte Esterase,   (*)     WBC, UA 6-10 (*)     Bacteria, UA Trace (*)     Squamous Epithelial Cells, UA Occ (*)     Mucous, UA Trace (*)     All other components within normal limits   POCT URINE PREGNANCY          Imaging Results    None          Medications   Tdap (BOOSTRIX) vaccine injection 0.5 mL (0.5 mLs Intramuscular Given 7/6/22 1105)                 ED Course as of 07/06/22 1151   Wed Jul 06, 2022   1143 Reassessed patient at this time. She is sitting comfortably in the exam chair. Her urine is most likely consistent with dirty catch, but since patient was bitten by a dog, I will cover her with Augmentin which will cover acute cysitis. She verbalized understanding to this. Encouraged her to go to the health unit for STD testing if she desires. She verbalized undertanding. Animal control was contacted while in the ED. Strict return precautions given. Stable for discharge.  [VJ]      ED Course User Index  [VJ] Hailey Patten PA-C             Clinical Impression:   Final diagnoses:  [A60.00] Genital herpes simplex, unspecified site (Primary)  [W54.0XXA] Dog bite, initial encounter  [R82.90] Abnormal finding on urinalysis          ED Disposition Condition    Discharge Stable        ED Prescriptions     Medication Sig Dispense Start Date End Date Auth. Provider    acyclovir (ZOVIRAX) 800 MG Tab Take 1 tablet (800 mg total) by mouth 3 (three) times daily. for 2 days 6 tablet 7/6/2022 7/8/2022 Hailey Patten PA-C    amoxicillin-clavulanate 875-125mg (AUGMENTIN) 875-125 mg per tablet Take 1 tablet by mouth 2 (two) times daily. 14 tablet 7/6/2022  Hailey Patten PA-C    ketorolac (TORADOL) 10 mg tablet Take 1 tablet (10 mg total) by mouth every 8 (eight) hours. for 5 days 15 tablet 7/6/2022 7/11/2022 Hailey Patten PA-C        Follow-up Information     Follow up With Specialties Details Why Contact Info    [facility] Emergency Medicine In 3 days As needed,  If symptoms worsen [address] [facility] [id number] [phone]    [facility]  In 1 week  [address] [phone]           Hailey Patten PA-C  07/06/22 1152

## 2022-07-30 ENCOUNTER — HOSPITAL ENCOUNTER (EMERGENCY)
Facility: HOSPITAL | Age: 37
Discharge: HOME | End: 2022-07-30
Attending: EMERGENCY MEDICINE
Payer: MEDICARE

## 2022-07-30 VITALS
RESPIRATION RATE: 18 BRPM | DIASTOLIC BLOOD PRESSURE: 72 MMHG | BODY MASS INDEX: 23.32 KG/M2 | HEIGHT: 65 IN | SYSTOLIC BLOOD PRESSURE: 136 MMHG | TEMPERATURE: 100 F | OXYGEN SATURATION: 96 % | WEIGHT: 140 LBS | HEART RATE: 68 BPM

## 2022-07-30 DIAGNOSIS — T63.304A SPIDER BITE WOUND, UNDETERMINED INTENT, INITIAL ENCOUNTER: ICD-10-CM

## 2022-07-30 DIAGNOSIS — A60.00 GENITAL HERPES SIMPLEX, UNSPECIFIED SITE: Primary | ICD-10-CM

## 2022-07-30 PROCEDURE — 96372 THER/PROPH/DIAG INJ SC/IM: CPT | Performed by: EMERGENCY MEDICINE

## 2022-07-30 PROCEDURE — 87070 CULTURE OTHR SPECIMN AEROBIC: CPT | Performed by: EMERGENCY MEDICINE

## 2022-07-30 PROCEDURE — 63600175 PHARM REV CODE 636 W HCPCS: Performed by: EMERGENCY MEDICINE

## 2022-07-30 PROCEDURE — 25000003 PHARM REV CODE 250: Performed by: EMERGENCY MEDICINE

## 2022-07-30 PROCEDURE — 99284 EMERGENCY DEPT VISIT MOD MDM: CPT

## 2022-07-30 RX ORDER — HYDROCODONE BITARTRATE AND ACETAMINOPHEN 5; 325 MG/1; MG/1
1 TABLET ORAL EVERY 6 HOURS PRN
Qty: 12 TABLET | Refills: 0 | OUTPATIENT
Start: 2022-07-30 | End: 2023-04-05

## 2022-07-30 RX ORDER — ONDANSETRON 4 MG/1
4 TABLET, ORALLY DISINTEGRATING ORAL
Status: COMPLETED | OUTPATIENT
Start: 2022-07-30 | End: 2022-07-30

## 2022-07-30 RX ORDER — MORPHINE SULFATE 4 MG/ML
4 INJECTION, SOLUTION INTRAMUSCULAR; INTRAVENOUS
Status: COMPLETED | OUTPATIENT
Start: 2022-07-30 | End: 2022-07-30

## 2022-07-30 RX ORDER — VALACYCLOVIR HYDROCHLORIDE 1 G/1
1000 TABLET, FILM COATED ORAL DAILY
Qty: 10 TABLET | Refills: 0 | OUTPATIENT
Start: 2022-07-30 | End: 2022-10-22

## 2022-07-30 RX ORDER — CLINDAMYCIN HYDROCHLORIDE 300 MG/1
300 CAPSULE ORAL EVERY 6 HOURS
Qty: 20 CAPSULE | Refills: 0 | Status: SHIPPED | OUTPATIENT
Start: 2022-07-30 | End: 2022-08-04

## 2022-07-30 RX ADMIN — ONDANSETRON 4 MG: 4 TABLET, ORALLY DISINTEGRATING ORAL at 10:07

## 2022-07-30 RX ADMIN — BACITRACIN ZINC, NEOMYCIN SULFATE, AND POLYMYXIN B SULFATE: 400; 3.5; 5 OINTMENT TOPICAL at 10:07

## 2022-07-30 RX ADMIN — MORPHINE SULFATE 4 MG: 4 INJECTION, SOLUTION INTRAMUSCULAR; INTRAVENOUS at 10:07

## 2022-07-31 ENCOUNTER — HOSPITAL ENCOUNTER (EMERGENCY)
Facility: HOSPITAL | Age: 37
Discharge: HOME | End: 2022-07-31
Attending: INTERNAL MEDICINE
Payer: MEDICARE

## 2022-07-31 VITALS
TEMPERATURE: 99 F | WEIGHT: 140 LBS | DIASTOLIC BLOOD PRESSURE: 80 MMHG | HEIGHT: 65 IN | BODY MASS INDEX: 23.32 KG/M2 | HEART RATE: 93 BPM | OXYGEN SATURATION: 98 % | SYSTOLIC BLOOD PRESSURE: 125 MMHG | RESPIRATION RATE: 18 BRPM

## 2022-07-31 DIAGNOSIS — Z51.89 WOUND CHECK, ABSCESS: Primary | ICD-10-CM

## 2022-07-31 PROCEDURE — 99283 EMERGENCY DEPT VISIT LOW MDM: CPT

## 2022-07-31 PROCEDURE — 96372 THER/PROPH/DIAG INJ SC/IM: CPT | Performed by: NURSE PRACTITIONER

## 2022-07-31 PROCEDURE — 63600175 PHARM REV CODE 636 W HCPCS: Performed by: NURSE PRACTITIONER

## 2022-07-31 PROCEDURE — 25000003 PHARM REV CODE 250: Performed by: NURSE PRACTITIONER

## 2022-07-31 RX ORDER — CLINDAMYCIN PHOSPHATE 150 MG/ML
600 INJECTION, SOLUTION INTRAVENOUS
Status: DISCONTINUED | OUTPATIENT
Start: 2022-07-31 | End: 2022-07-31

## 2022-07-31 RX ORDER — HYDROMORPHONE HYDROCHLORIDE 2 MG/ML
1 INJECTION, SOLUTION INTRAMUSCULAR; INTRAVENOUS; SUBCUTANEOUS
Status: COMPLETED | OUTPATIENT
Start: 2022-07-31 | End: 2022-07-31

## 2022-07-31 RX ORDER — CLINDAMYCIN HYDROCHLORIDE 300 MG/1
300 CAPSULE ORAL
Status: COMPLETED | OUTPATIENT
Start: 2022-07-31 | End: 2022-07-31

## 2022-07-31 RX ADMIN — HYDROMORPHONE HYDROCHLORIDE 1 MG: 2 INJECTION, SOLUTION INTRAMUSCULAR; INTRAVENOUS; SUBCUTANEOUS at 02:07

## 2022-07-31 RX ADMIN — CLINDAMYCIN HYDROCHLORIDE 300 MG: 300 CAPSULE ORAL at 02:07

## 2022-07-31 NOTE — ED TRIAGE NOTES
Pt complaint of worsening infection to right hand and was seen at [facility] lastnight

## 2022-07-31 NOTE — ED PROVIDER NOTES
Encounter Date: 7/30/2022       History     Chief Complaint   Patient presents with    Insect Bite     36 year old female complains of a spider bite to her hand just prior to arrival.  She states that she did see the spider and killed it but she did not bring it in.  Lesion to her hand is very painful.  She also has vaginal herpes which is a recurrent issue and is requesting treatment for that.  Her boyfriend also checked into the emergency room for similar blister lesion to his thumb and genital herpes.  She has no vaginal discharge and is sexually active with 1 partner.        Review of patient's allergies indicates:   Allergen Reactions    Sulfamethoxazole-trimethoprim Anaphylaxis     Past Medical History:   Diagnosis Date    Asthma     Genital herpes      No past surgical history on file.  No family history on file.  Social History     Tobacco Use    Smoking status: Current Every Day Smoker     Packs/day: 0.50    Smokeless tobacco: Never Used   Substance Use Topics    Drug use: Yes     Frequency: 3.0 times per week     Types: Methamphetamines     Review of Systems   Skin: Positive for rash and wound.   All other systems reviewed and are negative.      Physical Exam     Initial Vitals [07/30/22 2231]   BP Pulse Resp Temp SpO2   133/83 105 18 98.6 °F (37 °C) 100 %      MAP       --         Physical Exam    Nursing note and vitals reviewed.  Constitutional: She appears well-developed and well-nourished. She is not diaphoretic. No distress.   HENT:   Head: Normocephalic and atraumatic.   Mouth/Throat: Oropharynx is clear and moist.   Eyes: Conjunctivae are normal. Pupils are equal, round, and reactive to light.   Neck: Neck supple.   Cardiovascular: Normal rate, regular rhythm, normal heart sounds and intact distal pulses.   Pulmonary/Chest: Breath sounds normal. No respiratory distress. She has no wheezes. She has no rhonchi. She has no rales.   Abdominal: Abdomen is soft. Bowel sounds are normal. She  exhibits no distension. There is no abdominal tenderness. There is no guarding.   Genitourinary:    Genitourinary Comments: deferred     Musculoskeletal:         General: No tenderness or edema. Normal range of motion.      Cervical back: Neck supple.     Neurological: She is alert and oriented to person, place, and time.   Skin: Skin is warm and dry. Capillary refill takes less than 2 seconds. No rash noted.   See the image of the lesion up to the thumb which is a vesicle, fluid was sent for bacterial culture, with some surrounding erythema and swelling.   Psychiatric: She has a normal mood and affect. Thought content normal.         ED Course   Procedures  Labs Reviewed   WOUND CULTURE (OLG)          Imaging Results    None          Medications   morphine injection 4 mg (4 mg Intramuscular Given 7/30/22 2250)   ondansetron disintegrating tablet 4 mg (4 mg Oral Given 7/30/22 2251)   neomycin-bacitracin-polymyxin ointment ( Topical (Top) Given 7/30/22 2251)     Medical Decision Making:   Differential Diagnosis:   Patient states the blister lesion on her some is a spider bite and that she saw the spider and killed it. It could be a brown recluse bite.  However, the other possibility is herpetic nicole considering she is currently having an outbreak of herpes genitalia.  I also did a bacterial culture on the wound due to the possibility of bacterial infection.    ED Management:  Patient will be prescribed antibiotics and Valtrex to treat her for bacterial and viral infection.      I discussed with the patient the possibilities for her blister lesion to her thumb which could including brown recluse bite.  If this is a brown recluse bite, this will become ulcerative, necrotic and worsen.  At this time, the best treatment is wound care and antibiotics as there is not an antivenom specific for brown recluse.  I also and putting her on Valtrex to cover her for genital herpes and the thumb lesion could possibly be herpetic  nicole.  The area was cleaned and bandaged.  Bacterial culture was performed as well.                 Clinical Impression:   Final diagnoses:  [A60.00] Genital herpes simplex, unspecified site (Primary)  [T63.304A] Spider bite wound, undetermined intent, initial encounter          ED Disposition Condition    Discharge Stable        ED Prescriptions     Medication Sig Dispense Start Date End Date Auth. Provider    valACYclovir (VALTREX) 1000 MG tablet Take 1 tablet (1,000 mg total) by mouth once daily. 10 tablet 7/30/2022 7/30/2023 Dasha Pizano MD    HYDROcodone-acetaminophen (NORCO) 5-325 mg per tablet Take 1 tablet by mouth every 6 (six) hours as needed for Pain. 12 tablet 7/30/2022  Dasha Pizano MD    clindamycin (CLEOCIN) 300 MG capsule Take 1 capsule (300 mg total) by mouth every 6 (six) hours. for 5 days 20 capsule 7/30/2022 8/4/2022 Dasha Pizano MD        Follow-up Information     Follow up With Specialties Details Why Contact Info    PCP  Schedule an appointment as soon as possible for a visit in 1 week             Dasha Pizano MD  07/31/22 3911

## 2022-07-31 NOTE — ED PROVIDER NOTES
"Encounter Date: 7/31/2022       History     Chief Complaint   Patient presents with    Recheck     Pt complaint of worsening infection to right hand and was seen at [facility] lastnight     Patient states "spider bite" abscess to the lateral base of her right second digit. States that she was seen here last night for the same and was prescribed Clindamycin. States that she has not filled her prescription yet. States continued pain to the area. Denies any increase in redness or swelling. Denies any fever. States that she is here because she needs pain control until she gets her medicine filled this afternoon.        Review of patient's allergies indicates:   Allergen Reactions    Sulfamethoxazole-trimethoprim Anaphylaxis     Past Medical History:   Diagnosis Date    Asthma     Genital herpes      History reviewed. No pertinent surgical history.  History reviewed. No pertinent family history.  Social History     Tobacco Use    Smoking status: Current Every Day Smoker     Packs/day: 0.50    Smokeless tobacco: Never Used   Substance Use Topics    Drug use: Yes     Frequency: 3.0 times per week     Types: Methamphetamines     Review of Systems   Constitutional: Negative.  Negative for chills and fever.   HENT: Negative.    Eyes: Negative.    Respiratory: Negative.    Cardiovascular: Negative.    Gastrointestinal: Negative.    Endocrine: Negative.    Genitourinary: Negative.    Musculoskeletal: Negative.    Skin: Negative.         abscess   Allergic/Immunologic: Negative.    Neurological: Negative.    Hematological: Negative.    Psychiatric/Behavioral: Negative.    All other systems reviewed and are negative.      Physical Exam     Initial Vitals [07/31/22 1341]   BP Pulse Resp Temp SpO2   125/80 93 18 98.6 °F (37 °C) 98 %      MAP       --         Physical Exam    Nursing note and vitals reviewed.  Constitutional: She appears well-developed and well-nourished. No distress.   HENT:   Head: Normocephalic and " atraumatic.   Mouth/Throat: Oropharynx is clear and moist.   Eyes: Conjunctivae and EOM are normal. Pupils are equal, round, and reactive to light.   Neck: Neck supple.   Normal range of motion.  Cardiovascular: Normal rate, regular rhythm, normal heart sounds and intact distal pulses.   Pulses:       Radial pulses are 2+ on the right side and 2+ on the left side.   Pulmonary/Chest: Breath sounds normal. No respiratory distress.   Abdominal: Abdomen is soft. She exhibits no distension. There is no abdominal tenderness.   Musculoskeletal:         General: No tenderness or edema. Normal range of motion.      Right hand: No tenderness or bony tenderness. Normal range of motion. Normal capillary refill.      Left hand: Normal.      Cervical back: Normal range of motion and neck supple.      Comments: Right second finger with full ROM and tenderness to palpation.     Neurological: She is alert and oriented to person, place, and time. She has normal strength.   Skin: Skin is warm and dry. Capillary refill takes less than 2 seconds. No rash noted.   There is a mildly erythematous lesion with mild swelling to lateral base of second finger.   Psychiatric: She has a normal mood and affect. Thought content normal.         ED Course   Procedures  Labs Reviewed - No data to display       Imaging Results    None          Medications   HYDROmorphone (PF) injection 1 mg (1 mg Intramuscular Given 7/31/22 1411)   clindamycin capsule 300 mg (300 mg Oral Given 7/31/22 1411)     Medical Decision Making:   Initial Assessment:   Patient is awake, alert, afebrile, nontoxic appearing, and neuro vascular intact in the ED. Vesicular appearing lesion to right second finger with mild erythema and mild swelling and is similar in appearance to picture taken for chart last night.   Differential Diagnosis:   Abscess, Spider Bite.   ED Management:  Discussed with patient that she needs to start her antibiotics this afternoon. We will give her a dose  here in the ED and provide pain control. Patient given strict ED return precautions.                       Clinical Impression:   Final diagnoses:  [Z51.89] Wound check, abscess (Primary)          ED Disposition Condition    Discharge Stable        ED Prescriptions     None        Follow-up Information     Follow up With Specialties Details Why Contact Info    Primary Care Provider               AUDREY Pruett  07/31/22 6025

## 2022-08-02 LAB — BACTERIA SPEC CULT: ABNORMAL

## 2022-08-12 ENCOUNTER — HOSPITAL ENCOUNTER (EMERGENCY)
Facility: HOSPITAL | Age: 37
Discharge: HOME | End: 2022-08-12
Attending: FAMILY MEDICINE
Payer: MEDICARE

## 2022-08-12 ENCOUNTER — TELEPHONE (OUTPATIENT)
Dept: EMERGENCY MEDICINE | Facility: HOSPITAL | Age: 37
End: 2022-08-12

## 2022-08-12 VITALS
SYSTOLIC BLOOD PRESSURE: 135 MMHG | HEART RATE: 86 BPM | WEIGHT: 121.69 LBS | DIASTOLIC BLOOD PRESSURE: 90 MMHG | TEMPERATURE: 98 F | BODY MASS INDEX: 20.25 KG/M2 | OXYGEN SATURATION: 98 % | RESPIRATION RATE: 18 BRPM

## 2022-08-12 DIAGNOSIS — J45.909 ASTHMA, UNSPECIFIED ASTHMA SEVERITY, UNSPECIFIED WHETHER COMPLICATED, UNSPECIFIED WHETHER PERSISTENT: ICD-10-CM

## 2022-08-12 DIAGNOSIS — Z86.19 HX OF HERPES GENITALIS: ICD-10-CM

## 2022-08-12 DIAGNOSIS — R21 RASH: Primary | ICD-10-CM

## 2022-08-12 PROCEDURE — 99283 EMERGENCY DEPT VISIT LOW MDM: CPT

## 2022-08-12 RX ORDER — ALBUTEROL SULFATE 90 UG/1
1-2 AEROSOL, METERED RESPIRATORY (INHALATION) EVERY 6 HOURS PRN
Qty: 6.7 G | Refills: 0 | OUTPATIENT
Start: 2022-08-12 | End: 2023-04-19

## 2022-08-13 NOTE — ED PROVIDER NOTES
Encounter Date: 8/12/2022       History     Chief Complaint   Patient presents with    Rash     C/o blistered area to vagina x 1 month. Hx of herpes. States is on valtrex but with no relief.     Sinusitis     Also sinus problem x 1 week     37 YO WF in ER with complaints of painful bumps to vagina X 1 month. Hx of genital herpes and has been taking Valtrex without resolution of symptoms. States her boyfriend also has skin ulcer and lesions to his groin. She was also sexually active with another partner who also had skin lesions to his groin. Denies fever, chills, chest pain, SOB, abdominal pain, N/V/D, HA or dizziness. Pt also asking of refill on her asthma inhaler. She is not having any wheezing but just wants to have it in case she needs it. No other complaints.    The history is provided by the patient.     Review of patient's allergies indicates:   Allergen Reactions    Sulfamethoxazole-trimethoprim Anaphylaxis     Past Medical History:   Diagnosis Date    Asthma     Genital herpes      History reviewed. No pertinent surgical history.  History reviewed. No pertinent family history.  Social History     Tobacco Use    Smoking status: Current Every Day Smoker     Packs/day: 0.50    Smokeless tobacco: Never Used   Substance Use Topics    Alcohol use: Never    Drug use: Yes     Frequency: 3.0 times per week     Types: Methamphetamines     Review of Systems   Constitutional: Negative for chills and fever.   HENT: Negative for congestion and sore throat.    Respiratory: Negative for shortness of breath.    Cardiovascular: Negative for chest pain.   Gastrointestinal: Negative for abdominal pain, diarrhea, nausea and vomiting.   Genitourinary: Negative for dysuria.   Musculoskeletal: Negative for back pain.   Skin: Positive for rash.   Neurological: Negative for dizziness, weakness, light-headedness and headaches.   Hematological: Does not bruise/bleed easily.   All other systems reviewed and are  negative.      Physical Exam     Initial Vitals [08/12/22 1806]   BP Pulse Resp Temp SpO2   (!) 134/92 86 20 97.9 °F (36.6 °C) 100 %      MAP       --         Physical Exam    Nursing note and vitals reviewed.  Constitutional: She appears well-developed and well-nourished. She is not diaphoretic. No distress.   HENT:   Head: Normocephalic and atraumatic.   Mouth/Throat: Oropharynx is clear and moist.   Eyes: Conjunctivae are normal.   Cardiovascular: Normal rate, regular rhythm and normal heart sounds.   Pulmonary/Chest: Breath sounds normal.   Musculoskeletal:         General: Normal range of motion.     Neurological: She is alert and oriented to person, place, and time. She has normal strength.   Skin: Skin is warm and dry. Lesion noted.   Herpetic appearing leison noted to right labia and hard flesh colored < 1 cm lesion with ttp noted to left labia, no drainage or abscess noted   Psychiatric: She has a normal mood and affect.         ED Course   Procedures  Labs Reviewed   MONKEYPOX (ORTHOPOXVIRUS) PCR          Imaging Results    None          Medications - No data to display                       Clinical Impression:   Final diagnoses:  [R21] Rash (Primary)  [Z86.19] Hx of herpes genitalis  [J45.909] Asthma, unspecified asthma severity, unspecified whether complicated, unspecified whether persistent          ED Disposition Condition    Discharge Stable        ED Prescriptions     Medication Sig Dispense Start Date End Date Auth. Provider    albuterol (PROVENTIL/VENTOLIN HFA) 90 mcg/actuation inhaler Inhale 1-2 puffs into the lungs every 6 (six) hours as needed for Wheezing. Rescue 6.7 g 8/12/2022 8/12/2023 GLORIA Olson        Follow-up Information     Follow up With Specialties Details Why Contact Info    [facility] Emergency Medicine In 3 days As needed, If symptoms worsen [address] [facility] [id number] [phone]    Primary Care Provider  Schedule an  appointment as soon as possible for a visit in 5 days  Call a PCP to make an appointment for follow up within 3-5  days.  You can all the phone number on the back of your insurance card for accepting providers as discussed.           GLORIA Olson  08/14/22 0133

## 2022-08-13 NOTE — DISCHARGE INSTRUCTIONS
Do not have sex until all lesions have healed completely and you have gotten the results of your Monkeypox test.    Return to ER for any worsening symptoms.     Follow up with a PCP in 3-5 days.

## 2022-08-18 LAB
NONVAR ORTHPX DNA SPEC QL NAA+PROBE: NORMAL
SPECIMEN SOURCE: NORMAL

## 2022-09-21 ENCOUNTER — HISTORICAL (OUTPATIENT)
Dept: ADMINISTRATIVE | Facility: HOSPITAL | Age: 37
End: 2022-09-21
Payer: MEDICARE

## 2022-10-22 ENCOUNTER — HOSPITAL ENCOUNTER (EMERGENCY)
Facility: HOSPITAL | Age: 37
Discharge: HOME | End: 2022-10-22
Attending: STUDENT IN AN ORGANIZED HEALTH CARE EDUCATION/TRAINING PROGRAM
Payer: MEDICARE

## 2022-10-22 VITALS
BODY MASS INDEX: 19.33 KG/M2 | SYSTOLIC BLOOD PRESSURE: 161 MMHG | HEIGHT: 65 IN | OXYGEN SATURATION: 98 % | TEMPERATURE: 99 F | HEART RATE: 90 BPM | RESPIRATION RATE: 18 BRPM | DIASTOLIC BLOOD PRESSURE: 106 MMHG | WEIGHT: 116 LBS

## 2022-10-22 DIAGNOSIS — L02.31 ABSCESS OF BUTTOCK: Primary | ICD-10-CM

## 2022-10-22 DIAGNOSIS — A60.09 HERPES GENITALIS IN WOMEN: ICD-10-CM

## 2022-10-22 DIAGNOSIS — N30.01 ACUTE CYSTITIS WITH HEMATURIA: ICD-10-CM

## 2022-10-22 LAB
APPEARANCE UR: CLEAR
BACTERIA #/AREA URNS AUTO: ABNORMAL /HPF
BILIRUB UR QL STRIP.AUTO: ABNORMAL MG/DL
COLOR UR AUTO: ABNORMAL
GLUCOSE UR QL STRIP.AUTO: NEGATIVE MG/DL
KETONES UR QL STRIP.AUTO: ABNORMAL MG/DL
LEUKOCYTE ESTERASE UR QL STRIP.AUTO: ABNORMAL UNIT/L
NITRITE UR QL STRIP.AUTO: POSITIVE
PH UR STRIP.AUTO: 6 [PH]
PROT UR QL STRIP.AUTO: 30 MG/DL
RBC #/AREA URNS AUTO: ABNORMAL /HPF
RBC UR QL AUTO: ABNORMAL UNIT/L
SP GR UR STRIP.AUTO: >=1.03
SQUAMOUS #/AREA URNS AUTO: ABNORMAL /HPF
UROBILINOGEN UR STRIP-ACNC: 0.2 MG/DL
WBC #/AREA URNS AUTO: ABNORMAL /HPF

## 2022-10-22 PROCEDURE — 96372 THER/PROPH/DIAG INJ SC/IM: CPT | Performed by: STUDENT IN AN ORGANIZED HEALTH CARE EDUCATION/TRAINING PROGRAM

## 2022-10-22 PROCEDURE — 25000003 PHARM REV CODE 250: Performed by: STUDENT IN AN ORGANIZED HEALTH CARE EDUCATION/TRAINING PROGRAM

## 2022-10-22 PROCEDURE — 99284 EMERGENCY DEPT VISIT MOD MDM: CPT | Mod: 25

## 2022-10-22 PROCEDURE — 63600175 PHARM REV CODE 636 W HCPCS: Performed by: STUDENT IN AN ORGANIZED HEALTH CARE EDUCATION/TRAINING PROGRAM

## 2022-10-22 PROCEDURE — 81001 URINALYSIS AUTO W/SCOPE: CPT | Performed by: STUDENT IN AN ORGANIZED HEALTH CARE EDUCATION/TRAINING PROGRAM

## 2022-10-22 RX ORDER — CLINDAMYCIN HYDROCHLORIDE 300 MG/1
300 CAPSULE ORAL EVERY 6 HOURS
Qty: 40 CAPSULE | Refills: 0 | Status: SHIPPED | OUTPATIENT
Start: 2022-10-22 | End: 2022-11-01

## 2022-10-22 RX ORDER — CLINDAMYCIN PHOSPHATE 150 MG/ML
600 INJECTION, SOLUTION INTRAVENOUS ONCE
Status: DISCONTINUED | OUTPATIENT
Start: 2022-10-22 | End: 2022-10-22

## 2022-10-22 RX ORDER — KETOROLAC TROMETHAMINE 30 MG/ML
30 INJECTION, SOLUTION INTRAMUSCULAR; INTRAVENOUS
Status: COMPLETED | OUTPATIENT
Start: 2022-10-22 | End: 2022-10-22

## 2022-10-22 RX ORDER — VALACYCLOVIR HYDROCHLORIDE 1 G/1
1000 TABLET, FILM COATED ORAL 3 TIMES DAILY
Qty: 21 TABLET | Refills: 0 | OUTPATIENT
Start: 2022-10-22 | End: 2023-04-05

## 2022-10-22 RX ORDER — CLINDAMYCIN HYDROCHLORIDE 150 MG/1
300 CAPSULE ORAL
Status: COMPLETED | OUTPATIENT
Start: 2022-10-22 | End: 2022-10-22

## 2022-10-22 RX ORDER — IBUPROFEN 800 MG/1
800 TABLET ORAL EVERY 6 HOURS PRN
Qty: 20 TABLET | Refills: 0 | Status: SHIPPED | OUTPATIENT
Start: 2022-10-22

## 2022-10-22 RX ADMIN — CLINDAMYCIN HYDROCHLORIDE 300 MG: 150 CAPSULE ORAL at 11:10

## 2022-10-22 RX ADMIN — KETOROLAC TROMETHAMINE 30 MG: 30 INJECTION, SOLUTION INTRAMUSCULAR at 11:10

## 2022-10-22 NOTE — ED PROVIDER NOTES
Encounter Date: 10/22/2022       History     Chief Complaint   Patient presents with    Abscess     Pt states she has 4-5 abscess between butt cheeks, pt states she is also having an active herpes outbreak.     37-year-old female presents to ED with complaint of buttock abscesses an outbreak of genital herpes.  Patient states that she recently did heroin thinks that is why she has gotten buttock abscesses.  Denies any drainage or bleeding.  No fevers, chills, nausea, vomiting or any other symptoms.    Review of patient's allergies indicates:   Allergen Reactions    Sulfamethoxazole-trimethoprim Anaphylaxis     Past Medical History:   Diagnosis Date    Asthma     Genital herpes      History reviewed. No pertinent surgical history.  History reviewed. No pertinent family history.  Social History     Tobacco Use    Smoking status: Every Day     Packs/day: 0.50     Types: Cigarettes    Smokeless tobacco: Never   Substance Use Topics    Alcohol use: Never    Drug use: Yes     Frequency: 3.0 times per week     Types: Methamphetamines     Review of Systems   Genitourinary:  Positive for genital sores.   Skin:         Neg except as stated in HPI   All other systems reviewed and are negative.    Physical Exam     Initial Vitals [10/22/22 1022]   BP Pulse Resp Temp SpO2   (!) 161/106 90 18 98.7 °F (37.1 °C) 98 %      MAP       --         Physical Exam    Nursing note and vitals reviewed.  Constitutional: She appears well-developed and well-nourished.   HENT:   Head: Normocephalic and atraumatic.   Eyes: EOM are normal. Pupils are equal, round, and reactive to light.   Neck: Neck supple.   Normal range of motion.  Cardiovascular:  Normal rate, regular rhythm, normal heart sounds and intact distal pulses.           Pulmonary/Chest: Breath sounds normal.   Abdominal: Abdomen is soft. Bowel sounds are normal.   Genitourinary:    No vaginal discharge.      Genitourinary Comments: Abscess to r buttock and L buttock/external labia,  +induration, no fluctuance or drainage, +erythema, ttp  Herpetic lesions to inner labia-left     Musculoskeletal:         General: Normal range of motion.      Cervical back: Normal range of motion and neck supple.     Neurological: She is alert and oriented to person, place, and time. She has normal strength. GCS score is 15. GCS eye subscore is 4. GCS verbal subscore is 5. GCS motor subscore is 6.   Skin: Skin is warm and dry. Capillary refill takes less than 2 seconds.   Psychiatric: She has a normal mood and affect. Thought content normal.       ED Course   Procedures  Labs Reviewed   URINALYSIS, REFLEX TO URINE CULTURE - Abnormal; Notable for the following components:       Result Value    Protein, UA 30 (*)     Ketones, UA Trace (*)     Blood, UA Large (*)     Bilirubin, UA Small (*)     Nitrites, UA Positive (*)     Leukocyte Esterase, UA Small (*)     All other components within normal limits   URINALYSIS, MICROSCOPIC          Imaging Results    None          Medications   ketorolac injection 30 mg (30 mg Intramuscular Given 10/22/22 1144)   clindamycin capsule 300 mg (300 mg Oral Given 10/22/22 1143)                              Clinical Impression:   Final diagnoses:  [L02.31] Abscess of buttock (Primary)  [A60.09] Herpes genitalis in women  [N30.01] Acute cystitis with hematuria        ED Disposition Condition    Discharge Stable          ED Prescriptions       Medication Sig Dispense Start Date End Date Auth. Provider    valACYclovir (VALTREX) 1000 MG tablet Take 1 tablet (1,000 mg total) by mouth 3 (three) times daily. for 7 days 21 tablet 10/22/2022 10/29/2022 Gianna Castro MD    clindamycin (CLEOCIN) 300 MG capsule Take 1 capsule (300 mg total) by mouth every 6 (six) hours. for 10 days 40 capsule 10/22/2022 11/1/2022 Gianna Castro MD    ibuprofen (ADVIL,MOTRIN) 800 MG tablet Take 1 tablet (800 mg total) by mouth every 6 (six) hours as needed for Pain. 20 tablet 10/22/2022 -- Gianna Castro  MD          Follow-up Information       Follow up With Specialties Details Why Contact Info    [facility] PCP  In 1 week               Gianna Castro MD  10/22/22 1135       Gianna Castro MD  10/22/22 1140       Gianna Castro MD  10/22/22 1200

## 2023-04-05 ENCOUNTER — HOSPITAL ENCOUNTER (EMERGENCY)
Facility: HOSPITAL | Age: 38
Discharge: HOME | End: 2023-04-05
Attending: EMERGENCY MEDICINE
Payer: MEDICARE

## 2023-04-05 VITALS
WEIGHT: 125 LBS | HEART RATE: 120 BPM | OXYGEN SATURATION: 96 % | SYSTOLIC BLOOD PRESSURE: 105 MMHG | RESPIRATION RATE: 18 BRPM | TEMPERATURE: 97 F | DIASTOLIC BLOOD PRESSURE: 76 MMHG | HEIGHT: 65 IN | BODY MASS INDEX: 20.83 KG/M2
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
## 2023-04-05 DIAGNOSIS — B00.2 ORAL HERPES SIMPLEX INFECTION: ICD-10-CM

## 2023-04-05 DIAGNOSIS — T19.2XXA RETAINED TAMPON, INITIAL ENCOUNTER: Primary | ICD-10-CM

## 2023-04-05 PROCEDURE — 99284 EMERGENCY DEPT VISIT MOD MDM: CPT

## 2023-04-05 PROCEDURE — 25000003 PHARM REV CODE 250: Performed by: EMERGENCY MEDICINE

## 2023-04-05 RX ORDER — HYDROCODONE BITARTRATE AND ACETAMINOPHEN 5; 325 MG/1; MG/1
1 TABLET ORAL EVERY 6 HOURS PRN
Qty: 12 TABLET | Refills: 0 | Status: SHIPPED | OUTPATIENT
Start: 2023-04-05

## 2023-04-05 RX ORDER — ALUMINUM HYDROXIDE, MAGNESIUM HYDROXIDE, AND SIMETHICONE 2400; 240; 2400 MG/30ML; MG/30ML; MG/30ML
30 SUSPENSION ORAL EVERY 6 HOURS PRN
Status: DISCONTINUED | OUTPATIENT
Start: 2023-04-05 | End: 2023-04-05 | Stop reason: HOSPADM

## 2023-04-05 RX ORDER — HYDROCODONE BITARTRATE AND ACETAMINOPHEN 5; 325 MG/1; MG/1
1 TABLET ORAL
Status: COMPLETED | OUTPATIENT
Start: 2023-04-05 | End: 2023-04-05

## 2023-04-05 RX ORDER — VALACYCLOVIR HYDROCHLORIDE 1 G/1
1000 TABLET, FILM COATED ORAL 3 TIMES DAILY
Qty: 21 TABLET | Refills: 0 | Status: SHIPPED | OUTPATIENT
Start: 2023-04-05 | End: 2023-04-19 | Stop reason: SDUPTHER

## 2023-04-05 RX ADMIN — HYDROCODONE BITARTRATE AND ACETAMINOPHEN 1 TABLET: 5; 325 TABLET ORAL at 06:04

## 2023-04-05 RX ADMIN — ALUMINUM HYDROXIDE, MAGNESIUM HYDROXIDE, AND DIMETHICONE 30 ML: 400; 400; 40 SUSPENSION ORAL at 06:04

## 2023-04-05 NOTE — DISCHARGE INSTRUCTIONS
Maalox will coat the oral ulcers and can be applied as needed before eating.  You can also take motrin in addition to the Norco for pain.  Use caution with tampon use in the future.  You can developed toxic shock syndrome from a tampon left in for too long.  The discharge should resolve within a day or 2.

## 2023-04-06 NOTE — ED PROVIDER NOTES
Encounter Date: 4/5/2023       History     Chief Complaint   Patient presents with    Foreign Body in Vagina     Pt to er c/o possibly having a retained tampon for the past 7 days. Pt also c/o mouth pain.     37-year-old female states that she has malodorous vaginal discharge and realize that she left a tampon in from last week.  She has no abdominal pain.  She is sexually active in a monogamous relationship and does not have an history of STD.  She also complains of severe mouth pain and states that she has oral herpes and has not been able to eat or drink very much.  She is asking for pain medicine and treatment for the oral herpes.      Review of patient's allergies indicates:   Allergen Reactions    Sulfamethoxazole-trimethoprim Anaphylaxis     Past Medical History:   Diagnosis Date    Asthma     Genital herpes      No past surgical history on file.  No family history on file.  Social History     Tobacco Use    Smoking status: Every Day     Packs/day: 0.50     Types: Cigarettes    Smokeless tobacco: Never   Substance Use Topics    Alcohol use: Never    Drug use: Yes     Frequency: 3.0 times per week     Types: Methamphetamines     Review of Systems   HENT:  Positive for mouth sores.    Genitourinary:  Positive for vaginal discharge.   All other systems reviewed and are negative.    Physical Exam     Initial Vitals [04/05/23 1752]   BP Pulse Resp Temp SpO2   105/76 (!) 120 20 97.2 °F (36.2 °C) 96 %      MAP       --         Physical Exam    Nursing note and vitals reviewed.  Constitutional: She appears well-developed and well-nourished.   HENT:   Aphthous ulcers noted to the inner cheeks, tongue, inside the lips.  No swelling in her mouth no difficulty swallowing.   Pulmonary/Chest: No respiratory distress.   Abdominal: Abdomen is soft. She exhibits no mass. There is no abdominal tenderness. There is no rebound and no guarding.   Genitourinary:    Genitourinary Comments: Pelvic exam performed with Irina  technician, assisting.  She did have retained tampon which was removed.  Cervix appears normal.  She has a scant malodorous discharge but no cervical motion tenderness       Lymphadenopathy:     She has no cervical adenopathy.   Neurological: She is alert and oriented to person, place, and time.   Skin: Capillary refill takes less than 2 seconds.       ED Course   Procedures  Labs Reviewed - No data to display       Imaging Results    None          Medications   HYDROcodone-acetaminophen 5-325 mg per tablet 1 tablet (1 tablet Oral Given 4/5/23 1836)     Medical Decision Making:   Initial Assessment:   37-year-old female states that she has malodorous vaginal discharge and realize that she left a tampon in from last week.  She has no abdominal pain.  She is sexually active in a monogamous relationship and does not have an history of STD.  She also complains of severe mouth pain and states that she has oral herpes and has not been able to eat or drink very much.  She is asking for pain medicine and treatment for the oral herpes.    Differential Diagnosis:   Differential diagnosis includes but is not limited to retained tampon, cervicitis, septic shock, dehydration, oral herpes, pharyngitis  ED Management:  Patient was found to have a retained tampon but no sign of PID, cervical ulceration, septic shock.  The tampon was removed without difficulty in the ER and the discharge should clear with a little bit of time.  She also has oral herpes which I will treat as well.  She was given a dose of Norco in the emergency room and Maalox to coat the ulcers.  I will give her a prescription for Norco and Valtrex for that.  She states she is left a tampon in in the past and needs to be more careful and is aware of the risk. Heart rate at discharge 92                        Clinical Impression:   Final diagnoses:  [T19.2XXA] Retained tampon, initial encounter (Primary)  [B00.2] Oral herpes simplex infection        ED Disposition  Condition    Discharge Stable          ED Prescriptions       Medication Sig Dispense Start Date End Date Auth. Provider    HYDROcodone-acetaminophen (NORCO) 5-325 mg per tablet Take 1 tablet by mouth every 6 (six) hours as needed for Pain. 12 tablet 4/5/2023 -- Dasha Pizano MD    valACYclovir (VALTREX) 1000 MG tablet Take 1 tablet (1,000 mg total) by mouth 3 (three) times daily. for 7 days 21 tablet 4/5/2023 4/12/2023 Dasha Pizano MD          Follow-up Information       Follow up With Specialties Details Why Contact Info    PCP   As needed              Dasha Pizano MD  04/06/23 1219

## 2023-04-19 ENCOUNTER — HOSPITAL ENCOUNTER (EMERGENCY)
Facility: HOSPITAL | Age: 38
Discharge: HOME | End: 2023-04-19
Attending: EMERGENCY MEDICINE
Payer: MEDICARE

## 2023-04-19 VITALS
OXYGEN SATURATION: 99 % | TEMPERATURE: 99 F | DIASTOLIC BLOOD PRESSURE: 85 MMHG | WEIGHT: 123 LBS | SYSTOLIC BLOOD PRESSURE: 118 MMHG | HEIGHT: 65 IN | HEART RATE: 98 BPM | BODY MASS INDEX: 20.49 KG/M2 | RESPIRATION RATE: 20 BRPM

## 2023-04-19 DIAGNOSIS — K12.1 MOUTH ULCERS: ICD-10-CM

## 2023-04-19 DIAGNOSIS — R05.9 COUGH: ICD-10-CM

## 2023-04-19 DIAGNOSIS — J06.9 VIRAL URI WITH COUGH: Primary | ICD-10-CM

## 2023-04-19 DIAGNOSIS — L01.00 IMPETIGO: ICD-10-CM

## 2023-04-19 LAB
ALBUMIN SERPL-MCNC: 4.4 G/DL (ref 3.5–5)
ALBUMIN/GLOB SERPL: 1.1 RATIO (ref 1.1–2)
ALP SERPL-CCNC: 82 UNIT/L (ref 40–150)
ALT SERPL-CCNC: 13 UNIT/L (ref 0–55)
AMPHET UR QL SCN: POSITIVE
APPEARANCE UR: CLEAR
AST SERPL-CCNC: 16 UNIT/L (ref 5–34)
B-HCG SERPL QL: NEGATIVE
BARBITURATE SCN PRESENT UR: NEGATIVE
BASOPHILS # BLD AUTO: 0.04 X10(3)/MCL (ref 0–0.2)
BASOPHILS NFR BLD AUTO: 0.4 %
BENZODIAZ UR QL SCN: POSITIVE
BILIRUB UR QL STRIP.AUTO: NEGATIVE MG/DL
BILIRUBIN DIRECT+TOT PNL SERPL-MCNC: 0.3 MG/DL
BUN SERPL-MCNC: 13.5 MG/DL (ref 7–18.7)
CALCIUM SERPL-MCNC: 10.2 MG/DL (ref 8.4–10.2)
CANNABINOIDS UR QL SCN: NEGATIVE
CHLORIDE SERPL-SCNC: 105 MMOL/L (ref 98–107)
CO2 SERPL-SCNC: 25 MMOL/L (ref 22–29)
COCAINE UR QL SCN: NEGATIVE
COLOR UR AUTO: YELLOW
CREAT SERPL-MCNC: 0.81 MG/DL (ref 0.55–1.02)
EOSINOPHIL # BLD AUTO: 0.15 X10(3)/MCL (ref 0–0.9)
EOSINOPHIL NFR BLD AUTO: 1.3 %
ERYTHROCYTE [DISTWIDTH] IN BLOOD BY AUTOMATED COUNT: 17.1 % (ref 11.5–17)
FENTANYL UR QL SCN: POSITIVE
FLUAV AG UPPER RESP QL IA.RAPID: NOT DETECTED
FLUBV AG UPPER RESP QL IA.RAPID: NOT DETECTED
GFR SERPLBLD CREATININE-BSD FMLA CKD-EPI: >60 MLS/MIN/1.73/M2
GLOBULIN SER-MCNC: 3.9 GM/DL (ref 2.4–3.5)
GLUCOSE SERPL-MCNC: 70 MG/DL (ref 74–100)
GLUCOSE UR QL STRIP.AUTO: NEGATIVE MG/DL
HCT VFR BLD AUTO: 43.5 % (ref 37–47)
HGB BLD-MCNC: 14.2 G/DL (ref 12–16)
IMM GRANULOCYTES # BLD AUTO: 0.05 X10(3)/MCL (ref 0–0.04)
IMM GRANULOCYTES NFR BLD AUTO: 0.4 %
KETONES UR QL STRIP.AUTO: NEGATIVE MG/DL
LACTATE SERPL-SCNC: 1.9 MMOL/L (ref 0.5–2.2)
LEUKOCYTE ESTERASE UR QL STRIP.AUTO: NEGATIVE UNIT/L
LYMPHOCYTES # BLD AUTO: 2.29 X10(3)/MCL (ref 0.6–4.6)
LYMPHOCYTES NFR BLD AUTO: 20.1 %
MCH RBC QN AUTO: 27.4 PG (ref 27–31)
MCHC RBC AUTO-ENTMCNC: 32.6 G/DL (ref 33–36)
MCV RBC AUTO: 83.8 FL (ref 80–94)
MDMA UR QL SCN: NEGATIVE
MONOCYTES # BLD AUTO: 0.9 X10(3)/MCL (ref 0.1–1.3)
MONOCYTES NFR BLD AUTO: 7.9 %
NEUTROPHILS # BLD AUTO: 7.97 X10(3)/MCL (ref 2.1–9.2)
NEUTROPHILS NFR BLD AUTO: 69.9 %
NITRITE UR QL STRIP.AUTO: NEGATIVE
NRBC BLD AUTO-RTO: 0 %
OPIATES UR QL SCN: POSITIVE
PCP UR QL: NEGATIVE
PH UR STRIP.AUTO: 6.5 [PH]
PH UR: 6.5 [PH] (ref 3–11)
PLATELET # BLD AUTO: 325 X10(3)/MCL (ref 130–400)
PMV BLD AUTO: 10.7 FL (ref 7.4–10.4)
POTASSIUM SERPL-SCNC: 4 MMOL/L (ref 3.5–5.1)
PROT SERPL-MCNC: 8.3 GM/DL (ref 6.4–8.3)
PROT UR QL STRIP.AUTO: NEGATIVE MG/DL
RBC # BLD AUTO: 5.19 X10(6)/MCL (ref 4.2–5.4)
RBC UR QL AUTO: NEGATIVE UNIT/L
SARS-COV-2 RNA RESP QL NAA+PROBE: NOT DETECTED
SODIUM SERPL-SCNC: 139 MMOL/L (ref 136–145)
SP GR UR STRIP.AUTO: 1.02
UROBILINOGEN UR STRIP-ACNC: 0.2 MG/DL
WBC # SPEC AUTO: 11.4 X10(3)/MCL (ref 4.5–11.5)

## 2023-04-19 PROCEDURE — 85025 COMPLETE CBC W/AUTO DIFF WBC: CPT | Performed by: EMERGENCY MEDICINE

## 2023-04-19 PROCEDURE — 96375 TX/PRO/DX INJ NEW DRUG ADDON: CPT

## 2023-04-19 PROCEDURE — 99284 EMERGENCY DEPT VISIT MOD MDM: CPT | Mod: 25

## 2023-04-19 PROCEDURE — 87591 N.GONORRHOEAE DNA AMP PROB: CPT | Performed by: EMERGENCY MEDICINE

## 2023-04-19 PROCEDURE — 25000242 PHARM REV CODE 250 ALT 637 W/ HCPCS: Performed by: EMERGENCY MEDICINE

## 2023-04-19 PROCEDURE — 81003 URINALYSIS AUTO W/O SCOPE: CPT | Mod: 59 | Performed by: EMERGENCY MEDICINE

## 2023-04-19 PROCEDURE — 80307 DRUG TEST PRSMV CHEM ANLYZR: CPT | Performed by: EMERGENCY MEDICINE

## 2023-04-19 PROCEDURE — 94640 AIRWAY INHALATION TREATMENT: CPT

## 2023-04-19 PROCEDURE — 80053 COMPREHEN METABOLIC PANEL: CPT | Performed by: EMERGENCY MEDICINE

## 2023-04-19 PROCEDURE — 83605 ASSAY OF LACTIC ACID: CPT | Performed by: EMERGENCY MEDICINE

## 2023-04-19 PROCEDURE — 0240U COVID/FLU A&B PCR: CPT | Performed by: EMERGENCY MEDICINE

## 2023-04-19 PROCEDURE — 63600175 PHARM REV CODE 636 W HCPCS: Performed by: EMERGENCY MEDICINE

## 2023-04-19 PROCEDURE — 81025 URINE PREGNANCY TEST: CPT | Performed by: EMERGENCY MEDICINE

## 2023-04-19 PROCEDURE — 96374 THER/PROPH/DIAG INJ IV PUSH: CPT

## 2023-04-19 PROCEDURE — 25000003 PHARM REV CODE 250: Performed by: EMERGENCY MEDICINE

## 2023-04-19 RX ORDER — ACYCLOVIR 400 MG/1
400 TABLET ORAL 3 TIMES DAILY
Qty: 15 TABLET | Refills: 0 | Status: SHIPPED | OUTPATIENT
Start: 2023-04-19 | End: 2023-04-24

## 2023-04-19 RX ORDER — MORPHINE SULFATE 4 MG/ML
2 INJECTION, SOLUTION INTRAMUSCULAR; INTRAVENOUS
Status: COMPLETED | OUTPATIENT
Start: 2023-04-19 | End: 2023-04-19

## 2023-04-19 RX ORDER — MUPIROCIN 20 MG/G
OINTMENT TOPICAL 3 TIMES DAILY
Qty: 2 G | Refills: 0 | Status: SHIPPED | OUTPATIENT
Start: 2023-04-19

## 2023-04-19 RX ORDER — LIDOCAINE HYDROCHLORIDE 20 MG/ML
5 SOLUTION OROPHARYNGEAL
Status: COMPLETED | OUTPATIENT
Start: 2023-04-19 | End: 2023-04-19

## 2023-04-19 RX ORDER — ALBUTEROL SULFATE 90 UG/1
1-2 AEROSOL, METERED RESPIRATORY (INHALATION) EVERY 6 HOURS PRN
Qty: 8 G | Refills: 0 | Status: SHIPPED | OUTPATIENT
Start: 2023-04-19

## 2023-04-19 RX ORDER — LIDOCAINE HYDROCHLORIDE 10 MG/ML
5 INJECTION INFILTRATION; PERINEURAL
Status: DISCONTINUED | OUTPATIENT
Start: 2023-04-19 | End: 2023-04-19

## 2023-04-19 RX ORDER — ONDANSETRON 2 MG/ML
4 INJECTION INTRAMUSCULAR; INTRAVENOUS
Status: COMPLETED | OUTPATIENT
Start: 2023-04-19 | End: 2023-04-19

## 2023-04-19 RX ORDER — METHYLPREDNISOLONE SOD SUCC 125 MG
125 VIAL (EA) INJECTION
Status: COMPLETED | OUTPATIENT
Start: 2023-04-19 | End: 2023-04-19

## 2023-04-19 RX ORDER — IPRATROPIUM BROMIDE AND ALBUTEROL SULFATE 2.5; .5 MG/3ML; MG/3ML
3 SOLUTION RESPIRATORY (INHALATION) ONCE
Status: COMPLETED | OUTPATIENT
Start: 2023-04-19 | End: 2023-04-19

## 2023-04-19 RX ORDER — KETOROLAC TROMETHAMINE 10 MG/1
10 TABLET, FILM COATED ORAL EVERY 6 HOURS
Qty: 20 TABLET | Refills: 0 | Status: SHIPPED | OUTPATIENT
Start: 2023-04-19 | End: 2023-04-24

## 2023-04-19 RX ORDER — CEPHALEXIN 500 MG/1
500 CAPSULE ORAL 4 TIMES DAILY
Qty: 28 CAPSULE | Refills: 0 | Status: SHIPPED | OUTPATIENT
Start: 2023-04-19 | End: 2023-04-26

## 2023-04-19 RX ORDER — SODIUM CHLORIDE 9 MG/ML
1000 INJECTION, SOLUTION INTRAVENOUS
Status: COMPLETED | OUTPATIENT
Start: 2023-04-19 | End: 2023-04-19

## 2023-04-19 RX ORDER — KETOROLAC TROMETHAMINE 30 MG/ML
30 INJECTION, SOLUTION INTRAMUSCULAR; INTRAVENOUS
Status: COMPLETED | OUTPATIENT
Start: 2023-04-19 | End: 2023-04-19

## 2023-04-19 RX ORDER — PREDNISONE 50 MG/1
50 TABLET ORAL DAILY
Qty: 5 TABLET | Refills: 0 | Status: SHIPPED | OUTPATIENT
Start: 2023-04-19 | End: 2023-04-24

## 2023-04-19 RX ADMIN — ONDANSETRON HYDROCHLORIDE 4 MG: 2 SOLUTION INTRAMUSCULAR; INTRAVENOUS at 07:04

## 2023-04-19 RX ADMIN — METHYLPREDNISOLONE SODIUM SUCCINATE 125 MG: 125 INJECTION, POWDER, FOR SOLUTION INTRAMUSCULAR; INTRAVENOUS at 07:04

## 2023-04-19 RX ADMIN — LIDOCAINE HYDROCHLORIDE 5 ML: 20 SOLUTION ORAL; TOPICAL at 07:04

## 2023-04-19 RX ADMIN — KETOROLAC TROMETHAMINE 30 MG: 30 INJECTION, SOLUTION INTRAMUSCULAR at 08:04

## 2023-04-19 RX ADMIN — LIDOCAINE HYDROCHLORIDE 5 ML: 20 SOLUTION ORAL; TOPICAL at 08:04

## 2023-04-19 RX ADMIN — SODIUM CHLORIDE 1000 ML: 9 INJECTION, SOLUTION INTRAVENOUS at 08:04

## 2023-04-19 RX ADMIN — MORPHINE SULFATE 2 MG: 4 INJECTION INTRAVENOUS at 07:04

## 2023-04-19 RX ADMIN — IPRATROPIUM BROMIDE AND ALBUTEROL SULFATE 3 ML: 2.5; .5 SOLUTION RESPIRATORY (INHALATION) at 07:04

## 2023-04-19 NOTE — ED TRIAGE NOTES
"Pt complaint of cough for 3 days and "alomost out of inhaler" relating green sputum (2) herpes to mouth (3) abscess to left cheek that is worsening (4) sores to palm or right hand (4) right ear pain and general stomach ache (5) (fever)  "

## 2023-04-20 LAB
C TRACH DNA SPEC QL NAA+PROBE: NOT DETECTED
N GONORRHOEA DNA SPEC QL NAA+PROBE: NOT DETECTED

## 2023-04-20 NOTE — ED PROVIDER NOTES
"Encounter Date: 4/19/2023       History     Chief Complaint   Patient presents with    Cough     Pt complaint of cough for 3 days and "alomost out of inhaler" relating green sputum (2) herpes to mouth (3) abscess to left cheek that is worsening (4) sores to palm or right hand (4) right ear pain and general stomach ache (5) (fever)     Patient is a 36 yo F presenting with multiple complaints. Primary issue is cough x 3 days, productive of greenish sputum. She feels SOB with it but no distinct chest pain. She is an asthmatic and is almost out of her inhaler at home. She is not aware of any definitive exposures to flu or covid. She was also recently diagnosed with oral herpes and has been on medication but reports that is is not helping. She also has a painful area of her left hip that she describes as an abscess. She complains of subjective fevers Her boyfriend is here with similar symptoms.       Review of patient's allergies indicates:   Allergen Reactions    Sulfamethoxazole-trimethoprim Anaphylaxis     Past Medical History:   Diagnosis Date    Asthma     Genital herpes      No past surgical history on file.  No family history on file.  Social History     Tobacco Use    Smoking status: Every Day     Packs/day: 0.50     Types: Cigarettes    Smokeless tobacco: Never   Substance Use Topics    Alcohol use: Never    Drug use: Yes     Frequency: 3.0 times per week     Types: Methamphetamines     Review of Systems   Constitutional:  Positive for fever.   HENT:  Negative for sore throat.    Respiratory:  Positive for cough, shortness of breath and wheezing.    Cardiovascular:  Negative for chest pain.   Gastrointestinal:  Positive for nausea. Negative for constipation, diarrhea and vomiting.   Genitourinary:  Negative for dysuria.   Musculoskeletal:  Negative for back pain.   Skin:  Negative for rash.        abscess   Neurological:  Negative for weakness.   Hematological:  Does not bruise/bleed easily.     Physical Exam "     Initial Vitals   BP Pulse Resp Temp SpO2   04/19/23 1842 04/19/23 1842 04/19/23 1842 04/19/23 1846 04/19/23 1842   118/85 (!) 128 18 98.5 °F (36.9 °C) 98 %      MAP       --                Physical Exam    Nursing note and vitals reviewed.  Constitutional: She appears well-developed and well-nourished. No distress.   Patient is crying   HENT:   Head: Normocephalic and atraumatic.   Painful ulcerative lesions on the tongue and posterior oropharynx, consistent with herpes   Eyes: Conjunctivae are normal. Pupils are equal, round, and reactive to light.   Neck: Neck supple.   Cardiovascular:  Normal rate, regular rhythm and normal heart sounds.           No murmur heard.  Pulmonary/Chest: No respiratory distress. She has wheezes. She has rhonchi.   Abdominal: Abdomen is soft. Bowel sounds are normal. She exhibits no distension. There is no abdominal tenderness. There is no rebound and no guarding.   Genitourinary:    Genitourinary Comments: Sore on left buttock with mild erythema. No active drainage or fluctuance     Musculoskeletal:         General: No edema. Normal range of motion.      Cervical back: Neck supple.     Neurological: She is alert and oriented to person, place, and time.   Skin: Skin is warm and dry.   Psychiatric: She has a normal mood and affect. Thought content normal.       ED Course   Procedures  Labs Reviewed   COMPREHENSIVE METABOLIC PANEL - Abnormal; Notable for the following components:       Result Value    Glucose Level 70 (*)     Globulin 3.9 (*)     All other components within normal limits   CBC WITH DIFFERENTIAL - Abnormal; Notable for the following components:    MCHC 32.6 (*)     RDW 17.1 (*)     MPV 10.7 (*)     IG# 0.05 (*)     All other components within normal limits   DRUG SCREEN, URINE (BEAKER) - Abnormal; Notable for the following components:    Amphetamines, Urine Positive (*)     Benzodiazepine, Urine Positive (*)     Fentanyl, Urine Positive (*)     Opiates, Urine Positive  (*)     All other components within normal limits    Narrative:     Cut off concentrations:    Amphetamines - 1000 ng/ml  Barbiturates - 200 ng/ml  Benzodiazepine - 200 ng/ml  Cannabinoids (THC) - 50 ng/ml  Cocaine - 300 ng/ml  Fentanyl - 1.0 ng/ml  MDMA - 500 ng/ml  Opiates - 300 ng/ml   Phencyclidine (PCP) - 25 ng/ml    Specimen submitted for drug analysis and tested for pH and specific gravity in order to evaluate sample integrity. Suspect tampering if specific gravity is <1.003 and/or pH is not within the range of 4.5 - 8.0  False negatives may result form substances such as bleach added to urine.  False positives may result for the presence of a substance with similar chemical structure to the drug or its metabolite.    This test provides only a PRELIMINARY analytical test result. A more specific alternate chemical method must be used in order to obtain a confirmed analytical result. Gas chromatography/mass spectrometry (GC/MS) is the preferred confirmatory method. Other chemical confirmation methods are available. Clinical consideration and professional judgement should be applied to any drug of abuse test result, particularly when preliminary positive results are used.    Positive results will be confirmed only at the physicians request. Unconfirmed screening results are to be used only for medical purposes (treatment).        COVID/FLU A&B PCR - Normal    Narrative:     The Xpert Xpress SARS-CoV-2/FLU/RSV plus is a rapid, multiplexed real-time PCR test intended for the simultaneous qualitative detection and differentiation of SARS-CoV-2, Influenza A, Influenza B, and respiratory syncytial virus (RSV) viral RNA in either nasopharyngeal swab or nasal swab specimens.         LACTIC ACID, PLASMA - Normal   PREGNANCY TEST, URINE RAPID - Normal   CHLAMYDIA/GONORRHOEAE(GC), PCR   CBC W/ AUTO DIFFERENTIAL    Narrative:     The following orders were created for panel order CBC auto differential.  Procedure                                Abnormality         Status                     ---------                               -----------         ------                     CBC with Differential[[id number]]        Abnormal            Final result                 Please view results for these tests on the individual orders.   URINALYSIS, REFLEX TO URINE CULTURE          Imaging Results              X-Ray Chest AP Portable (Final result)  Result time 04/19/23 20:14:07      Final result by Siddharth Garcia MD (04/19/23 20:14:07)                   Impression:      No acute cardiopulmonary process.      Electronically signed by: Siddharth Garcia  Date:    04/19/2023  Time:    20:14               Narrative:    EXAMINATION:  XR CHEST AP PORTABLE    CLINICAL HISTORY:  Cough, unspecified    TECHNIQUE:  Single view of the chest    COMPARISON:  03/02/2020    FINDINGS:  No focal opacification, pleural effusion, or pneumothorax.    The cardiomediastinal silhouette is within normal limits.    No acute osseous abnormality.                                       Medications   methylPREDNISolone sodium succinate injection 125 mg (125 mg Intravenous Given 4/19/23 1929)   morphine injection 2 mg (2 mg Intravenous Given 4/19/23 1929)   LIDOcaine HCl 2% oral solution 5 mL (5 mLs Oral Given 4/19/23 1929)   ondansetron injection 4 mg (4 mg Intravenous Given 4/19/23 1929)   albuterol-ipratropium 2.5 mg-0.5 mg/3 mL nebulizer solution 3 mL (3 mLs Nebulization Given 4/19/23 1949)   0.9%  NaCl infusion (1,000 mLs Intravenous New Bag 4/19/23 2000)   ketorolac injection 30 mg (30 mg Intravenous Given 4/19/23 2042)   LIDOcaine HCl 2% oral solution 5 mL (5 mLs Oral Given 4/19/23 2047)     Medical Decision Making:    reviewed:    04/05/2023 04/05/2023   1  Hydrocodone-Acetamin 5-325 Mg 12.00  3  [facility]  [id number]   [facility] ([id number])  0  20.00 MME  [place]    11/17/2022 11/17/2022   1  Tramadol Hcl 50 Mg Tablet 15.00  5  Ja Meagan  [id number]   [facility] ([id number])  0  15.00 MME   Medicare [place]    08/01/2022 07/30/2022   1  Hydrocodone-Acetamin 5-325 Mg 12.00  3  [facility]  [id number]   [facility] ([id number])  0  20.00 MME  [place]are [place]                ED Course as of 04/20/23 0343   Wed Apr 19, 2023 2110 She was given apple juice to drink for her mildly low glucose. Results were reviewed with patient. Questions were answered along with a discussion of signs and symptoms to return for. I explained to the patient that due to her drug screen being positive for multiple drugs, I am not comofortable prescribing any narcotics to go home on as her risk of overdose is high, however I would prescribe non narcotic medications to treat her conditions. She was upset by this, stated I was judging her for her drug use. She then left to go smoke but did return to get her prescriptions and discharge paperwork. [GM]      ED Course User Index  [GM] Erin Couch MD                 Clinical Impression:   Final diagnoses:  [R05.9] Cough  [J06.9] Viral URI with cough (Primary)  [L01.00] Impetigo  [K12.1] Mouth ulcers        ED Disposition Condition    Discharge Stable          ED Prescriptions       Medication Sig Dispense Start Date End Date Auth. Provider    predniSONE (DELTASONE) 50 MG Tab Take 1 tablet (50 mg total) by mouth once daily. for 5 days 5 tablet 4/19/2023 4/24/2023 Erin Couch MD    ketorolac (TORADOL) 10 mg tablet Take 1 tablet (10 mg total) by mouth every 6 (six) hours. for 5 days 20 tablet 4/19/2023 4/24/2023 Erin Couch MD    albuterol (PROVENTIL/VENTOLIN HFA) 90 mcg/actuation inhaler Inhale 1-2 puffs into the lungs every 6 (six) hours as needed for Wheezing. Rescue 8 g 4/19/2023 -- Erin Couch MD    cephALEXin (KEFLEX) 500 MG capsule Take 1 capsule (500 mg total) by mouth 4 (four) times daily. for 7 days 28 capsule 4/19/2023 4/26/2023 Erin Couch MD    mupirocin (BACTROBAN) 2 % ointment Apply topically 3 (three) times daily. 2 g 4/19/2023 -- Erin Couch MD     diphenhydrAMINE-aluminum-magnesium hydroxide-simethicone-LIDOcaine HCl 2% Swish and spit 15 mLs every 4 (four) hours as needed. 200 each 4/19/2023 -- Erin Couch MD    acyclovir (ZOVIRAX) 400 MG tablet Take 1 tablet (400 mg total) by mouth 3 (three) times daily. for 5 days 15 tablet 4/19/2023 4/24/2023 Erin Couch MD          Follow-up Information       Follow up With Specialties Details Why Contact Info    Please call [phone] if you do not have a primary care doctor to follow up with.  In 2 days If symptoms worsen, return to the ED              Erin Couch MD  04/20/23 5235

## 2024-05-29 ENCOUNTER — HOSPITAL ENCOUNTER (EMERGENCY)
Facility: HOSPITAL | Age: 39
Discharge: HOME | End: 2024-05-29
Attending: STUDENT IN AN ORGANIZED HEALTH CARE EDUCATION/TRAINING PROGRAM
Payer: MEDICARE

## 2024-05-29 VITALS
WEIGHT: 107.06 LBS | HEIGHT: 65 IN | DIASTOLIC BLOOD PRESSURE: 79 MMHG | RESPIRATION RATE: 14 BRPM | TEMPERATURE: 98 F | OXYGEN SATURATION: 98 % | BODY MASS INDEX: 17.84 KG/M2 | HEART RATE: 98 BPM | SYSTOLIC BLOOD PRESSURE: 110 MMHG

## 2024-05-29 DIAGNOSIS — H61.22 IMPACTED CERUMEN OF LEFT EAR: ICD-10-CM

## 2024-05-29 DIAGNOSIS — B96.89 BACTERIAL VAGINOSIS: ICD-10-CM

## 2024-05-29 DIAGNOSIS — N76.0 BACTERIAL VAGINOSIS: ICD-10-CM

## 2024-05-29 DIAGNOSIS — N30.00 ACUTE CYSTITIS WITHOUT HEMATURIA: ICD-10-CM

## 2024-05-29 DIAGNOSIS — N89.8 VAGINAL DISCHARGE: Primary | ICD-10-CM

## 2024-05-29 DIAGNOSIS — A59.9 TRICHOMONAS INFECTION: ICD-10-CM

## 2024-05-29 LAB
B-HCG UR QL: NEGATIVE
BACTERIA #/AREA URNS AUTO: ABNORMAL /HPF
BILIRUB UR QL STRIP.AUTO: NEGATIVE
C TRACH DNA SPEC QL NAA+PROBE: NOT DETECTED
CLARITY UR: ABNORMAL
CLUE CELLS VAG QL WET PREP: ABNORMAL
COLOR UR AUTO: YELLOW
CTP QC/QA: YES
GLUCOSE UR QL STRIP: NORMAL
HGB UR QL STRIP: ABNORMAL
HIV 1+2 AB+HIV1 P24 AG SERPL QL IA: NONREACTIVE
HOLD SPECIMEN: NORMAL
HYALINE CASTS #/AREA URNS LPF: ABNORMAL /LPF
KETONES UR QL STRIP: ABNORMAL
LEUKOCYTE ESTERASE UR QL STRIP: 500
MUCOUS THREADS URNS QL MICRO: ABNORMAL /LPF
N GONORRHOEA DNA SPEC QL NAA+PROBE: NOT DETECTED
NITRITE UR QL STRIP: NEGATIVE
PH UR STRIP: 5.5 [PH]
PROT UR QL STRIP: ABNORMAL
RBC #/AREA URNS AUTO: ABNORMAL /HPF
SOURCE (OHS): NORMAL
SP GR UR STRIP.AUTO: 1.03 (ref 1–1.03)
SQUAMOUS #/AREA URNS LPF: ABNORMAL /HPF
T VAGINALIS VAG QL WET PREP: ABNORMAL
TRICHOMONAS UR QL COMP ASSIST: ABNORMAL /HPF
UROBILINOGEN UR STRIP-ACNC: ABNORMAL
WBC #/AREA URNS AUTO: ABNORMAL /HPF
WBC #/AREA VAG WET PREP: ABNORMAL
YEAST SPEC QL WET PREP: ABNORMAL

## 2024-05-29 PROCEDURE — 99284 EMERGENCY DEPT VISIT MOD MDM: CPT | Mod: 25

## 2024-05-29 PROCEDURE — 96372 THER/PROPH/DIAG INJ SC/IM: CPT | Performed by: PHYSICIAN ASSISTANT

## 2024-05-29 PROCEDURE — 81001 URINALYSIS AUTO W/SCOPE: CPT | Performed by: PHYSICIAN ASSISTANT

## 2024-05-29 PROCEDURE — 87086 URINE CULTURE/COLONY COUNT: CPT | Performed by: PHYSICIAN ASSISTANT

## 2024-05-29 PROCEDURE — 81025 URINE PREGNANCY TEST: CPT | Performed by: PHYSICIAN ASSISTANT

## 2024-05-29 PROCEDURE — 87389 HIV-1 AG W/HIV-1&-2 AB AG IA: CPT | Performed by: PHYSICIAN ASSISTANT

## 2024-05-29 PROCEDURE — 87210 SMEAR WET MOUNT SALINE/INK: CPT | Performed by: PHYSICIAN ASSISTANT

## 2024-05-29 PROCEDURE — 25000003 PHARM REV CODE 250: Performed by: PHYSICIAN ASSISTANT

## 2024-05-29 PROCEDURE — 63600175 PHARM REV CODE 636 W HCPCS: Performed by: PHYSICIAN ASSISTANT

## 2024-05-29 PROCEDURE — 87591 N.GONORRHOEAE DNA AMP PROB: CPT | Performed by: PHYSICIAN ASSISTANT

## 2024-05-29 RX ORDER — CEFTRIAXONE 500 MG/1
500 INJECTION, POWDER, FOR SOLUTION INTRAMUSCULAR; INTRAVENOUS
Status: COMPLETED | OUTPATIENT
Start: 2024-05-29 | End: 2024-05-29

## 2024-05-29 RX ORDER — NITROFURANTOIN 25; 75 MG/1; MG/1
100 CAPSULE ORAL 2 TIMES DAILY
Qty: 10 CAPSULE | Refills: 0 | Status: SHIPPED | OUTPATIENT
Start: 2024-05-29 | End: 2024-06-03

## 2024-05-29 RX ORDER — METRONIDAZOLE 500 MG/1
500 TABLET ORAL EVERY 12 HOURS
Qty: 14 TABLET | Refills: 0 | Status: SHIPPED | OUTPATIENT
Start: 2024-05-29 | End: 2024-06-05

## 2024-05-29 RX ORDER — LIDOCAINE HYDROCHLORIDE 10 MG/ML
2 INJECTION, SOLUTION EPIDURAL; INFILTRATION; INTRACAUDAL; PERINEURAL
Status: COMPLETED | OUTPATIENT
Start: 2024-05-29 | End: 2024-05-29

## 2024-05-29 RX ORDER — DOXYCYCLINE 100 MG/1
100 CAPSULE ORAL 2 TIMES DAILY
Qty: 14 CAPSULE | Refills: 0 | Status: SHIPPED | OUTPATIENT
Start: 2024-05-29 | End: 2024-06-05

## 2024-05-29 RX ORDER — ALBUTEROL SULFATE 0.83 MG/ML
2.5 SOLUTION RESPIRATORY (INHALATION) EVERY 6 HOURS PRN
Qty: 30 EACH | Refills: 0 | Status: SHIPPED | OUTPATIENT
Start: 2024-05-29

## 2024-05-29 RX ADMIN — LIDOCAINE HYDROCHLORIDE 20 MG: 10 INJECTION, SOLUTION EPIDURAL; INFILTRATION; INTRACAUDAL; PERINEURAL at 08:05

## 2024-05-29 RX ADMIN — CEFTRIAXONE SODIUM 500 MG: 500 INJECTION, POWDER, FOR SOLUTION INTRAMUSCULAR; INTRAVENOUS at 08:05

## 2024-05-30 NOTE — ED PROVIDER NOTES
Encounter Date: 5/29/2024       History     Chief Complaint   Patient presents with    Exposure to STD    Ear Fullness     38-year-old female presents to the emergency department stating she believes she was exposed to STDs and HIV.  Patient states she has vaginal discharge with a foul smell.  She also reports left ear pain and pressure.  She denies abdominal pain, fever, chills, nausea, vomiting, rash, hematuria, dysuria.    The history is provided by the patient. No  was used.     Review of patient's allergies indicates:   Allergen Reactions    Sulfamethoxazole-trimethoprim Anaphylaxis     Past Medical History:   Diagnosis Date    Asthma     Genital herpes      No past surgical history on file.  No family history on file.  Social History     Tobacco Use    Smoking status: Every Day     Current packs/day: 0.50     Types: Cigarettes    Smokeless tobacco: Never   Substance Use Topics    Alcohol use: Never    Drug use: Yes     Frequency: 3.0 times per week     Types: Methamphetamines     Review of Systems   Constitutional:  Negative for chills and fever.   HENT:  Positive for ear pain (left).    Eyes: Negative.    Respiratory:  Negative for cough, chest tightness and shortness of breath.    Cardiovascular:  Negative for chest pain and palpitations.   Gastrointestinal:  Negative for abdominal pain, diarrhea, nausea and vomiting.   Genitourinary:  Negative for decreased urine volume and dysuria.   Musculoskeletal:  Negative for back pain.   Skin:  Negative for rash and wound.   Neurological:  Negative for dizziness, weakness and numbness.       Physical Exam     Initial Vitals [05/29/24 1939]   BP Pulse Resp Temp SpO2   113/82 (!) 127 20 98 °F (36.7 °C) 98 %      MAP       --         Physical Exam    Nursing note and vitals reviewed.  Constitutional: She appears well-developed and well-nourished.   HENT:   Nose: Nose normal.   Eyes: Conjunctivae are normal.   Neck: Neck supple.   Normal range of  motion.  Cardiovascular:  Normal rate, regular rhythm, normal heart sounds and intact distal pulses.           Pulmonary/Chest: Breath sounds normal.   Abdominal: Abdomen is soft. Bowel sounds are normal. There is no abdominal tenderness. There is no rebound and no guarding.   Musculoskeletal:         General: Normal range of motion.      Cervical back: Normal range of motion and neck supple.     Neurological: She is alert.   Skin: Skin is warm. Capillary refill takes less than 2 seconds.         ED Course   Procedures  Labs Reviewed   WET PREP, GENITAL - Abnormal; Notable for the following components:       Result Value    WBC, Wet Prep Few (*)     Clue Cells, Wet Prep Few (*)     Trichomonas, Wet Prep Few (*)     All other components within normal limits   URINALYSIS, REFLEX TO URINE CULTURE - Abnormal; Notable for the following components:    Appearance, UA Turbid (*)     Protein, UA 2+ (*)     Ketones, UA Trace (*)     Blood, UA 1+ (*)     Urobilinogen, UA 1+ (*)     Leukocyte Esterase,  (*)     WBC, UA 21-50 (*)     Bacteria, UA Trace (*)     Squamous Epithelial Cells, UA Many (*)     Mucous, UA Many (*)     Trichomonas, UA Trace (*)     Hyaline Casts, UA 3-5 (*)     RBC, UA 6-10 (*)     All other components within normal limits   HIV 1 / 2 ANTIBODY - Normal   CHLAMYDIA/GONORRHOEAE(GC), PCR    Narrative:     The Xpert CT/NG test, performed on the GeneXpert system is a qualitative in vitro real-time polymerase chain reaction (PCR) test for the automated detected and differentiation for genomic DNA from Chlamydia trachomatis (CT) and/or Neisseria gonorrhoeae (NG).   CULTURE, URINE   EXTRA TUBES    Narrative:     The following orders were created for panel order EXTRA TUBES.  Procedure                               Abnormality         Status                     ---------                               -----------         ------                     Light Blue Top Hold[[id number]]                             In  process                 Light Green Top Hold[[id number]]                            In process                 Gold Top Hold[[id number]]                                   In process                   Please view results for these tests on the individual orders.   LIGHT BLUE TOP HOLD   LIGHT GREEN TOP HOLD   GOLD TOP HOLD   POCT URINE PREGNANCY          Imaging Results    None          Medications   cefTRIAXone injection 500 mg (500 mg Intramuscular Given 5/29/24 2026)   LIDOcaine (PF) 10 mg/ml (1%) injection 20 mg (20 mg Infiltration Given 5/29/24 2026)     Medical Decision Making  38-year-old female presents to the emergency department stating she believes she was exposed to STDs and HIV.  Patient states she has vaginal discharge with a foul smell.  She also reports left ear pain and pressure.  She denies abdominal pain, fever, chills, nausea, vomiting, rash, hematuria, dysuria.    DDx:  UTI, gonorrhea, chlamydia, Trichomonas, bacterial vaginosis, HIV    Urine and wet prep Positive for Trichomonas.  Positive clue cells.  Will treat Trichomonas and bacterial vaginosis with Flagyl.  Highly suspicious for G/C.  Rocephin injection given in the ED.   Doxycycline sent to pharmacy.  Urinalysis concerning for UTI.  Macrobid sent to pharmacy.  Urine culture pending.  Patient also requesting refill on albuterol inhaler.  Moderate amount of cerumen to left ear, Debrox sent to pharmacy. G/C negative      Amount and/or Complexity of Data Reviewed  Labs: ordered. Decision-making details documented in ED Course.    Risk  OTC drugs.  Prescription drug management.               ED Course as of 05/29/24 2203   Wed May 29, 2024   2047 Trichomonas, Wet Prep(!): Few [ER]   2047 Clue Cells, Wet Prep(!): Few [ER]   2047 WBC - Vaginal Screen(!): Few [ER]   2107 Trichomonas, UA(!): Trace [ER]   2107 Leukocyte Esterase, UA(!): 500 [ER]   2107 WBC, UA(!): 21-50 [ER]   2107 Bacteria, UA(!): Trace [ER]   2139 HIV: Nonreactive [ER]   2201  Chlamydia trachomatis PCR: Not Detected [ER]   2201 N. gonorrhea PCR: Not Detected [ER]      ED Course User Index  [ER] Zainab Galdamez PA                             Clinical Impression:  Final diagnoses:  [N89.8] Vaginal discharge (Primary)  [A59.9] Trichomonas infection  [N76.0, B96.89] Bacterial vaginosis  [H61.22] Impacted cerumen of left ear  [N30.00] Acute cystitis without hematuria          ED Disposition Condition    Discharge Stable          ED Prescriptions       Medication Sig Dispense Start Date End Date Auth. Provider    albuterol (PROVENTIL) 2.5 mg /3 mL (0.083 %) nebulizer solution Take 3 mLs (2.5 mg total) by nebulization every 6 (six) hours as needed for Wheezing. Rescue 30 each 5/29/2024 -- Zainab Galdamez PA    doxycycline (VIBRAMYCIN) 100 MG Cap Take 1 capsule (100 mg total) by mouth 2 (two) times daily. for 7 days 14 capsule 5/29/2024 6/5/2024 Zainab Galdamez PA    carbamide peroxide (DEBROX) 6.5 % otic solution Place 10 drops into both ears 2 (two) times daily. for 4 days 15 mL 5/29/2024 6/2/2024 Zainab Galdamez PA    metroNIDAZOLE (FLAGYL) 500 MG tablet Take 1 tablet (500 mg total) by mouth every 12 (twelve) hours. for 7 days 14 tablet 5/29/2024 6/5/2024 Zainab Galdamez PA    nitrofurantoin, macrocrystal-monohydrate, (MACROBID) 100 MG capsule Take 1 capsule (100 mg total) by mouth 2 (two) times daily. for 5 days 10 capsule 5/29/2024 6/3/2024 Zainab Galdamez PA          Follow-up Information       Follow up With Specialties Details Why Contact Info    [facility] Dept Emergency Medicine  As needed, If symptoms worsen [address] [facility] [address] [phone]             Zainab Galdamez PA  05/29/24 2207

## 2024-05-30 NOTE — DISCHARGE INSTRUCTIONS
Take antibiotics as prescribed with food and water until complete.  Follow up with the doctor within 1-2 days return if needed.

## 2024-06-01 LAB — BACTERIA UR CULT: NO GROWTH

## 2024-09-19 ENCOUNTER — HOSPITAL ENCOUNTER (EMERGENCY)
Facility: HOSPITAL | Age: 39
Discharge: HOME | End: 2024-09-19
Attending: STUDENT IN AN ORGANIZED HEALTH CARE EDUCATION/TRAINING PROGRAM
Payer: MEDICARE

## 2024-09-19 VITALS
HEART RATE: 83 BPM | WEIGHT: 110 LBS | BODY MASS INDEX: 19.49 KG/M2 | DIASTOLIC BLOOD PRESSURE: 103 MMHG | RESPIRATION RATE: 20 BRPM | OXYGEN SATURATION: 98 % | TEMPERATURE: 98 F | SYSTOLIC BLOOD PRESSURE: 148 MMHG | HEIGHT: 63 IN

## 2024-09-19 DIAGNOSIS — L02.411 ABSCESS OF RIGHT AXILLA: Primary | ICD-10-CM

## 2024-09-19 PROCEDURE — 99284 EMERGENCY DEPT VISIT MOD MDM: CPT

## 2024-09-19 RX ORDER — CLINDAMYCIN HYDROCHLORIDE 150 MG/1
450 CAPSULE ORAL EVERY 8 HOURS
Qty: 63 CAPSULE | Refills: 0 | Status: SHIPPED | OUTPATIENT
Start: 2024-09-19 | End: 2024-09-26

## 2024-09-19 RX ORDER — HYDROCODONE BITARTRATE AND ACETAMINOPHEN 5; 325 MG/1; MG/1
1 TABLET ORAL EVERY 6 HOURS PRN
Qty: 12 TABLET | Refills: 0 | Status: SHIPPED | OUTPATIENT
Start: 2024-09-19

## 2025-06-14 ENCOUNTER — HOSPITAL ENCOUNTER (EMERGENCY)
Facility: HOSPITAL | Age: 40
Discharge: PSYCHIATRIC OR SUBSTANCE USE FACILITY | End: 2025-06-14
Attending: EMERGENCY MEDICINE
Payer: MEDICARE

## 2025-06-14 VITALS
DIASTOLIC BLOOD PRESSURE: 92 MMHG | OXYGEN SATURATION: 100 % | HEIGHT: 63 IN | SYSTOLIC BLOOD PRESSURE: 131 MMHG | HEART RATE: 85 BPM | BODY MASS INDEX: 23.04 KG/M2 | RESPIRATION RATE: 20 BRPM | WEIGHT: 130 LBS | TEMPERATURE: 99 F

## 2025-06-14 DIAGNOSIS — F23 ACUTE PSYCHOSIS: Primary | ICD-10-CM

## 2025-06-14 DIAGNOSIS — F19.10 SUBSTANCE ABUSE: ICD-10-CM

## 2025-06-14 LAB
ACCEPTIBLE SP GR UR QL: 1.03 (ref 1–1.03)
ALBUMIN SERPL-MCNC: 4.4 G/DL (ref 3.5–5)
ALBUMIN/GLOB SERPL: 1.2 RATIO (ref 1.1–2)
ALP SERPL-CCNC: 61 UNIT/L (ref 40–150)
ALT SERPL-CCNC: 17 UNIT/L (ref 0–55)
AMPHET UR QL SCN: POSITIVE
ANION GAP SERPL CALC-SCNC: 8 MEQ/L
APAP SERPL-MCNC: <3 UG/ML (ref 10–30)
AST SERPL-CCNC: 17 UNIT/L (ref 11–45)
B-HCG UR QL: NEGATIVE
BACTERIA #/AREA URNS AUTO: ABNORMAL /HPF
BARBITURATE SCN PRESENT UR: NEGATIVE
BASOPHILS # BLD AUTO: 0.02 X10(3)/MCL
BASOPHILS NFR BLD AUTO: 0.3 %
BENZODIAZ UR QL SCN: POSITIVE
BILIRUB SERPL-MCNC: 0.6 MG/DL
BILIRUB UR QL STRIP.AUTO: NEGATIVE
BUN SERPL-MCNC: 30.9 MG/DL (ref 7–18.7)
CALCIUM SERPL-MCNC: 9.4 MG/DL (ref 8.4–10.2)
CANNABINOIDS UR QL SCN: POSITIVE
CAOX CRY UR QL COMP ASSIST: ABNORMAL
CHLORIDE SERPL-SCNC: 107 MMOL/L (ref 98–107)
CLARITY UR: CLEAR
CO2 SERPL-SCNC: 25 MMOL/L (ref 22–29)
COCAINE UR QL SCN: POSITIVE
COLOR UR AUTO: YELLOW
CREAT SERPL-MCNC: 0.75 MG/DL (ref 0.55–1.02)
CREAT/UREA NIT SERPL: 41
EOSINOPHIL # BLD AUTO: 0.02 X10(3)/MCL (ref 0–0.9)
EOSINOPHIL NFR BLD AUTO: 0.3 %
ERYTHROCYTE [DISTWIDTH] IN BLOOD BY AUTOMATED COUNT: 12.9 % (ref 11.5–17)
ETHANOL SERPL-MCNC: <10 MG/DL
FENTANYL UR QL SCN: POSITIVE
GFR SERPLBLD CREATININE-BSD FMLA CKD-EPI: >60 ML/MIN/1.73/M2
GLOBULIN SER-MCNC: 3.6 GM/DL (ref 2.4–3.5)
GLUCOSE SERPL-MCNC: 122 MG/DL (ref 74–100)
GLUCOSE UR QL STRIP: NORMAL
HCT VFR BLD AUTO: 37.2 % (ref 37–47)
HGB BLD-MCNC: 12.5 G/DL (ref 12–16)
HGB UR QL STRIP: NEGATIVE
IMM GRANULOCYTES # BLD AUTO: 0.03 X10(3)/MCL (ref 0–0.04)
IMM GRANULOCYTES NFR BLD AUTO: 0.4 %
KETONES UR QL STRIP: NEGATIVE
LEUKOCYTE ESTERASE UR QL STRIP: 25
LYMPHOCYTES # BLD AUTO: 1.99 X10(3)/MCL (ref 0.6–4.6)
LYMPHOCYTES NFR BLD AUTO: 25.4 %
MCH RBC QN AUTO: 30.9 PG (ref 27–31)
MCHC RBC AUTO-ENTMCNC: 33.6 G/DL (ref 33–36)
MCV RBC AUTO: 91.9 FL (ref 80–94)
MDMA UR QL SCN: NEGATIVE
MONOCYTES # BLD AUTO: 0.62 X10(3)/MCL (ref 0.1–1.3)
MONOCYTES NFR BLD AUTO: 7.9 %
MUCOUS THREADS URNS QL MICRO: ABNORMAL /LPF
NEUTROPHILS # BLD AUTO: 5.15 X10(3)/MCL (ref 2.1–9.2)
NEUTROPHILS NFR BLD AUTO: 65.7 %
NITRITE UR QL STRIP: NEGATIVE
NRBC BLD AUTO-RTO: 0 %
OPIATES UR QL SCN: NEGATIVE
PCP UR QL: NEGATIVE
PH UR STRIP: 6 [PH]
PH UR: 6 [PH] (ref 3–11)
PLATELET # BLD AUTO: 263 X10(3)/MCL (ref 130–400)
PMV BLD AUTO: 11.6 FL (ref 7.4–10.4)
POTASSIUM SERPL-SCNC: 3.9 MMOL/L (ref 3.5–5.1)
PROT SERPL-MCNC: 8 GM/DL (ref 6.4–8.3)
PROT UR QL STRIP: ABNORMAL
RBC # BLD AUTO: 4.05 X10(6)/MCL (ref 4.2–5.4)
RBC #/AREA URNS AUTO: ABNORMAL /HPF
SODIUM SERPL-SCNC: 140 MMOL/L (ref 136–145)
SP GR UR STRIP.AUTO: 1.03 (ref 1–1.03)
SQUAMOUS #/AREA URNS LPF: ABNORMAL /HPF
TSH SERPL-ACNC: 1.15 UIU/ML (ref 0.35–4.94)
UROBILINOGEN UR STRIP-ACNC: 2
WBC # BLD AUTO: 7.83 X10(3)/MCL (ref 4.5–11.5)
WBC #/AREA URNS AUTO: ABNORMAL /HPF

## 2025-06-14 PROCEDURE — 99285 EMERGENCY DEPT VISIT HI MDM: CPT

## 2025-06-14 PROCEDURE — 80053 COMPREHEN METABOLIC PANEL: CPT | Performed by: EMERGENCY MEDICINE

## 2025-06-14 PROCEDURE — 82077 ASSAY SPEC XCP UR&BREATH IA: CPT | Performed by: EMERGENCY MEDICINE

## 2025-06-14 PROCEDURE — 84443 ASSAY THYROID STIM HORMONE: CPT | Performed by: EMERGENCY MEDICINE

## 2025-06-14 PROCEDURE — 85025 COMPLETE CBC W/AUTO DIFF WBC: CPT | Performed by: EMERGENCY MEDICINE

## 2025-06-14 PROCEDURE — 81001 URINALYSIS AUTO W/SCOPE: CPT | Performed by: EMERGENCY MEDICINE

## 2025-06-14 PROCEDURE — 81025 URINE PREGNANCY TEST: CPT | Performed by: EMERGENCY MEDICINE

## 2025-06-14 PROCEDURE — 80307 DRUG TEST PRSMV CHEM ANLYZR: CPT | Performed by: EMERGENCY MEDICINE

## 2025-06-14 PROCEDURE — 80143 DRUG ASSAY ACETAMINOPHEN: CPT | Performed by: EMERGENCY MEDICINE

## 2025-06-14 PROCEDURE — 25000003 PHARM REV CODE 250: Performed by: EMERGENCY MEDICINE

## 2025-06-14 RX ORDER — HYDROXYZINE PAMOATE 25 MG/1
25 CAPSULE ORAL
Status: COMPLETED | OUTPATIENT
Start: 2025-06-14 | End: 2025-06-14

## 2025-06-14 RX ADMIN — HYDROXYZINE PAMOATE 25 MG: 25 CAPSULE ORAL at 10:06

## 2025-06-14 NOTE — ED PROVIDER NOTES
Encounter Date: 6/14/2025    SCRIBE #1 NOTE: I, Karen Morfin, am scribing for, and in the presence of,  Norma Bowser MD. I have scribed the following portions of the note - Other sections scribed: HPI, ROS, PE.       History     Chief Complaint   Patient presents with    Psychiatric Evaluation     Found naked in park. Patient admitted to cocaine use with EMS. Not speaking in triage but nods head when asked questions. Denies SI/HI.      Patient is a 39 year old female presenting to the ED via EMS for psychiatric evaluation. Per EMS, police found patient naked in a park covered in feces. When they put her in the back of the  car, patient defecated on herself then started rubbing it everywhere on the inside of the car. Police called EMS and told them she needed a psych evaluation. Per chart review, patient has been admited for polysubstance abuse and acute psychosis. She had a CT head in November 2024 that was negative for any masses or other intracranial pathology.    The history is limited due to patient reluctance to answer questions. She denies any complaints at this time.    The history is provided by the EMS personnel, medical records and the patient. No  was used.     Review of patient's allergies indicates:   Allergen Reactions    Sulfamethoxazole-trimethoprim Anaphylaxis     Past Medical History:   Diagnosis Date    Asthma     Genital herpes      No past surgical history on file.  No family history on file.  Social History[1]  Review of Systems   Unable to perform ROS: Psychiatric disorder       Physical Exam     Initial Vitals [06/14/25 0820]   BP Pulse Resp Temp SpO2   135/86 (!) 118 20 99 °F (37.2 °C) 100 %      MAP       --         Physical Exam    Nursing note and vitals reviewed.  Constitutional: She appears well-developed and well-nourished. She is not diaphoretic. No distress.   HENT:   Head: Normocephalic and atraumatic. Mouth/Throat: Oropharynx is clear and moist.    Eyes: Conjunctivae and EOM are normal. Pupils are equal, round, and reactive to light.   Neck: Neck supple.   Normal range of motion.  Cardiovascular:  Normal rate, regular rhythm and intact distal pulses.           Pulmonary/Chest: Breath sounds normal. No respiratory distress. She has no wheezes. She has no rhonchi. She has no rales.   Abdominal: Abdomen is soft. Bowel sounds are normal. She exhibits no distension. There is no abdominal tenderness.   Musculoskeletal:         General: No tenderness or edema. Normal range of motion.      Cervical back: Normal range of motion and neck supple.     Neurological: She is alert and oriented to person, place, and time. She has normal strength.   Skin: Skin is warm and dry. Capillary refill takes less than 2 seconds.   Psychiatric: Her affect is inappropriate. She is withdrawn. Thought content is paranoid. She expresses inappropriate judgment. She expresses no homicidal and no suicidal ideation. She expresses no suicidal plans and no homicidal plans.   RIS?  Bizarre affect         ED Course   Procedures  Labs Reviewed   COMPREHENSIVE METABOLIC PANEL - Abnormal       Result Value    Sodium 140      Potassium 3.9      Chloride 107      CO2 25      Glucose 122 (*)     Blood Urea Nitrogen 30.9 (*)     Creatinine 0.75      Calcium 9.4      Protein Total 8.0      Albumin 4.4      Globulin 3.6 (*)     Albumin/Globulin Ratio 1.2      Bilirubin Total 0.6      ALP 61      ALT 17      AST 17      eGFR >60      Anion Gap 8.0      BUN/Creatinine Ratio 41     URINALYSIS, REFLEX TO URINE CULTURE - Abnormal    Color, UA Yellow      Appearance, UA Clear      Specific Gravity, UA 1.032 (*)     pH, UA 6.0      Protein, UA Trace (*)     Glucose, UA Normal      Ketones, UA Negative      Blood, UA Negative      Bilirubin, UA Negative      Urobilinogen, UA 2.0 (*)     Nitrites, UA Negative      Leukocyte Esterase, UA 25 (*)     RBC, UA 0-5      WBC, UA 6-10 (*)     Bacteria, UA Trace       Squamous Epithelial Cells, UA Trace      Mucous, UA Occasional (*)     Calcium Oxalate Crystals, UA Occasional (*)    DRUG SCREEN, URINE (BEAKER) - Abnormal    Amphetamines, Urine Positive (*)     Barbiturates, Urine Negative      Benzodiazepine, Urine Positive (*)     Cannabinoids, Urine Positive (*)     Cocaine, Urine Positive (*)     Fentanyl, Urine Positive (*)     MDMA, Urine Negative      Opiates, Urine Negative      Phencyclidine, Urine Negative      pH, Urine 6.0      Specific Gravity, Urine Auto 1.032      Narrative:     Cut off concentrations:    Amphetamines - 1000 ng/ml  Barbiturates - 200 ng/ml  Benzodiazepine - 200 ng/ml  Cannabinoids (THC) - 50 ng/ml  Cocaine - 300 ng/ml  Fentanyl - 1.0 ng/ml  MDMA - 500 ng/ml  Opiates - 300 ng/ml   Phencyclidine (PCP) - 25 ng/ml    Specimen submitted for drug analysis and tested for pH and specific gravity in order to evaluate sample integrity. Suspect tampering if specific gravity is <1.003 and/or pH is not within the range of 4.5 - 8.0  False negatives may result form substances such as bleach added to urine.  False positives may result for the presence of a substance with similar chemical structure to the drug or its metabolite.    This test provides only a PRELIMINARY analytical test result. A more specific alternate chemical method must be used in order to obtain a confirmed analytical result. Gas chromatography/mass spectrometry (GC/MS) is the preferred confirmatory method. Other chemical confirmation methods are available. Clinical consideration and professional judgement should be applied to any drug of abuse test result, particularly when preliminary positive results are used.    Positive results will be confirmed only at the physicians request. Unconfirmed screening results are to be used only for medical purposes (treatment).        ACETAMINOPHEN LEVEL - Abnormal    Acetaminophen Level <3.0 (*)    CBC WITH DIFFERENTIAL - Abnormal    WBC 7.83      RBC 4.05  (*)     Hgb 12.5      Hct 37.2      MCV 91.9      MCH 30.9      MCHC 33.6      RDW 12.9      Platelet 263      MPV 11.6 (*)     Neut % 65.7      Lymph % 25.4      Mono % 7.9      Eos % 0.3      Basophil % 0.3      Imm Grans % 0.4      Neut # 5.15      Lymph # 1.99      Mono # 0.62      Eos # 0.02      Baso # 0.02      Imm Gran # 0.03      NRBC% 0.0     ALCOHOL,MEDICAL (ETHANOL) - Normal    Ethanol Level <10.0     TSH - Normal    TSH 1.147     PREGNANCY TEST, URINE RAPID - Normal    hCG Qualitative, Urine Negative     CBC W/ AUTO DIFFERENTIAL    Narrative:     The following orders were created for panel order CBC auto differential.  Procedure                               Abnormality         Status                     ---------                               -----------         ------                     CBC with Differential[[id number]]       Abnormal            Final result                 Please view results for these tests on the individual orders.          Imaging Results    None          Medications   hydrOXYzine pamoate capsule 25 mg (25 mg Oral Given 6/14/25 1027)     Medical Decision Making  89-year-old female with history of substance abuse and psychosis presents for evaluation.  She defecated on herself in the back of a police vehicle and spread around the interior of the vehicle.  She is not forthcoming with information, uncooperative with questioning with a bizarre affect, paranoid.  At times it appears she is hallucinating though she denies this.  She also denies suicidal and homicidal ideation but history is not reliable.  She does tell me she has no physical complaints at this time and is oriented to person, place and time.  She has been ambulatory.  In my opinion she is gravely disabled, PEC placed and screening psychiatric labs obtained.  I do not feel emergent repeat imaging of the head is warranted at this time.  Reassess.      The differential diagnosis includes, but is not limited to, substance  "abuse, thyroid dysfunction, electrolyte abnormality, psychiatric disorder, and others.    Amount and/or Complexity of Data Reviewed  External Data Reviewed: radiology and notes.  Labs: ordered. Decision-making details documented in ED Course.  Radiology: ordered and independent interpretation performed. Decision-making details documented in ED Course.    Risk  Prescription drug management.            Scribe Attestation:   Scribe #1: I performed the above scribed service and the documentation accurately describes the services I performed. I attest to the accuracy of the note.    Attending Attestation:           Physician Attestation for Scribe:  Physician Attestation Statement for Scribe #1: I, Norma Bowser MD, reviewed documentation, as scribed by Karen Morfin in my presence, and it is both accurate and complete.             ED Course as of 06/14/25 1359   Sat Jun 14, 2025   0955 PEC Time 0955 [AB]   1133 Medically clear. [RB]      ED Course User Index  [AB] Karen Morfin  [RB] Norma Bowser MD       Medically cleared for psychiatry placement: 6/14/2025 11:09 AM        BP (!) 141/91 (BP Location: Right arm, Patient Position: Sitting)   Pulse 95   Temp 99 °F (37.2 °C) (Oral)   Resp 20   Ht 5' 3" (1.6 m)   Wt 59 kg (130 lb)   SpO2 100%   BMI 23.03 kg/m²              Clinical Impression:  Final diagnoses:  [F23] Acute psychosis (Primary)  [F19.10] Substance abuse          ED Disposition Condition    Transfer to Psych Facility Stable          ED Prescriptions    None       Follow-up Information    None              Norma Bowser MD  06/14/25 1214         [1]   Social History  Tobacco Use    Smoking status: Every Day     Current packs/day: 0.50     Types: Cigarettes    Smokeless tobacco: Never   Substance Use Topics    Alcohol use: Never    Drug use: Yes     Frequency: 3.0 times per week     Types: Methamphetamines        Norma Bowser MD  06/14/25 1359    "